# Patient Record
Sex: FEMALE | Race: WHITE | NOT HISPANIC OR LATINO | Employment: STUDENT | URBAN - METROPOLITAN AREA
[De-identification: names, ages, dates, MRNs, and addresses within clinical notes are randomized per-mention and may not be internally consistent; named-entity substitution may affect disease eponyms.]

---
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
## 2022-01-19 ENCOUNTER — OFFICE VISIT (OUTPATIENT)
Dept: OBSTETRICS AND GYNECOLOGY | Age: 22
End: 2022-01-19

## 2022-01-19 VITALS
BODY MASS INDEX: 23.86 KG/M2 | HEIGHT: 61 IN | WEIGHT: 126.4 LBS | DIASTOLIC BLOOD PRESSURE: 64 MMHG | SYSTOLIC BLOOD PRESSURE: 104 MMHG

## 2022-01-19 DIAGNOSIS — Z30.41 ORAL CONTRACEPTIVE USE: Primary | ICD-10-CM

## 2022-01-19 PROCEDURE — 99212 OFFICE O/P EST SF 10 MIN: CPT | Performed by: STUDENT IN AN ORGANIZED HEALTH CARE EDUCATION/TRAINING PROGRAM

## 2022-01-19 NOTE — PROGRESS NOTES
[facility]   Obstetrics and Gynecology     2022      Patient:  Giovanna Carrero   MR#:[id number]    Office note    Chief Complaint   Patient presents with   • Follow-up     Follow up 3 mos OCP's and B/P, No problems today       Subjective     History of Present Illness  21 y.o. female  presents for follow-up after starting lo-Loestrin.  She is doing well with taking it every day.  Reports 1 to 2 days of light spotting each month but no other breakthrough bleeding.  Denies headache, nausea, breast tenderness.  Overall really likes and wants to continue taking it.  She is now sexually active with her partner.    There is no problem list on file for this patient.      Past Medical History:   Diagnosis Date   • No pertinent past medical history      Past Surgical History:   Procedure Laterality Date   • TONSILLECTOMY     • WISDOM TOOTH EXTRACTION       Obstetric History:  OB History        0    Para   0    Term   0       0    AB   0    Living   0       SAB   0    IAB   0    Ectopic   0    Molar   0    Multiple   0    Live Births   0               Menstrual History:     No LMP recorded (lmp unknown). (Menstrual status: Oral contraceptives).       The patient has never been pregnant.  Family History   Problem Relation Age of Onset   • Diabetes Father    • Hypertension Father    • Stroke Mother    • Breast cancer Neg Hx    • Ovarian cancer Neg Hx    • Uterine cancer Neg Hx    • Colon cancer Neg Hx      Social History     Tobacco Use   • Smoking status: Never Smoker   • Smokeless tobacco: Never Used   Vaping Use   • Vaping Use: Never used   Substance Use Topics   • Alcohol use: Never   • Drug use: Never     Patient has no known allergies.    Current Outpatient Medications:   •  Norethin-Eth Estrad-Fe Biphas (Lo Loestrin Fe) 1 MG-10 MCG / 10 MCG tablet, Take 1 tablet by mouth Daily., Disp: 84 tablet, Rfl: 3    The following portions of the patient's history were reviewed and updated  "as appropriate: allergies, current medications, past family history, past medical history, past social history, past surgical history and problem list.    Review of Systems   All other systems reviewed and are negative.      BP Readings from Last 3 Encounters:   01/19/22 104/64   10/13/21 104/60      Wt Readings from Last 3 Encounters:   01/19/22 57.3 kg (126 lb 6.4 oz) (48 %, Z= -0.06)*   10/13/21 55 kg (121 lb 3.2 oz) (38 %, Z= -0.30)*     * Growth percentiles are based on CDC (Girls, 2-20 Years) data.      BMI: Estimated body mass index is 23.88 kg/m² as calculated from the following:    Height as of this encounter: 154.9 cm (61\").    Weight as of this encounter: 57.3 kg (126 lb 6.4 oz). BSA: Estimated body surface area is 1.55 meters squared as calculated from the following:    Height as of this encounter: 154.9 cm (61\").    Weight as of this encounter: 57.3 kg (126 lb 6.4 oz).    Objective   Physical Exam  Vitals and nursing note reviewed.   Constitutional:       General: She is not in acute distress.     Appearance: Normal appearance.   Pulmonary:      Effort: Respiratory distress present.   Neurological:      Mental Status: She is alert.         Assessment/Plan     Diagnoses and all orders for this visit:    1. Oral contraceptive use (Primary)    - no issues with lo-loestrin, will continue, f/u one year for AC    Return in about 1 year (around 1/19/2023) for Annual physical.    Rula Waller MD   1/19/2022 08:55 EST  "

## 2022-02-04 ENCOUNTER — TELEPHONE (OUTPATIENT)
Dept: OBSTETRICS AND GYNECOLOGY | Age: 22
End: 2022-02-04

## 2022-02-16 ENCOUNTER — OFFICE VISIT (OUTPATIENT)
Dept: OBSTETRICS AND GYNECOLOGY | Age: 22
End: 2022-02-16

## 2022-02-16 VITALS
WEIGHT: 126 LBS | SYSTOLIC BLOOD PRESSURE: 104 MMHG | DIASTOLIC BLOOD PRESSURE: 62 MMHG | HEIGHT: 61 IN | BODY MASS INDEX: 23.79 KG/M2

## 2022-02-16 DIAGNOSIS — R35.0 URINARY FREQUENCY: Primary | ICD-10-CM

## 2022-02-16 DIAGNOSIS — Z13.89 SCREENING FOR BLOOD OR PROTEIN IN URINE: ICD-10-CM

## 2022-02-16 LAB
BILIRUB BLD-MCNC: ABNORMAL MG/DL
CLARITY, POC: CLEAR
COLOR UR: YELLOW
GLUCOSE UR STRIP-MCNC: NEGATIVE MG/DL
KETONES UR QL: NEGATIVE
LEUKOCYTE EST, POC: NEGATIVE
NITRITE UR-MCNC: NEGATIVE MG/ML
PH UR: 5.5 [PH] (ref 5–8)
PROT UR STRIP-MCNC: ABNORMAL MG/DL
RBC # UR STRIP: ABNORMAL /UL
SP GR UR: 1.03 (ref 1–1.03)
UROBILINOGEN UR QL: NORMAL

## 2022-02-16 PROCEDURE — 81002 URINALYSIS NONAUTO W/O SCOPE: CPT | Performed by: NURSE PRACTITIONER

## 2022-02-16 PROCEDURE — 99213 OFFICE O/P EST LOW 20 MIN: CPT | Performed by: NURSE PRACTITIONER

## 2022-02-16 RX ORDER — NITROFURANTOIN 25; 75 MG/1; MG/1
100 CAPSULE ORAL 2 TIMES DAILY
Qty: 14 CAPSULE | Refills: 0 | Status: SHIPPED | OUTPATIENT
Start: 2022-02-16 | End: 2022-02-23

## 2022-02-16 NOTE — PROGRESS NOTES
"Subjective     Chief Complaint   Patient presents with   • Follow-up     UTI       Giovanna Carrero is a 21 y.o.  whose LMP is Patient's last menstrual period was 2022.     Pt presents today with chief complaint of urinary frequency x 2 weeks  She denies dysuria or abnormal discharge  She is SA with fiance, no std risk  Denies pelvic pain, fever, chills  Pt of Dr. Waller      No Additional Complaints Reported    The following portions of the patient's history were reviewed and updated as appropriate:vital signs, allergies, current medications, past medical history, past social history, past surgical history and problem list      Review of Systems   Pertinent items are noted in HPI.     Objective      /62   Ht 154.9 cm (61\")   Wt 57.2 kg (126 lb)   LMP 2022   BMI 23.81 kg/m²     Physical Exam    General:   alert and no distress   Heart: Not performed today   Lungs: Not performed today.   Breast: Not performed today   Neck: na   Abdomen: {Not performed today   CVA: Not performed today   Pelvis: Not performed today   Extremities: Not performed today   Neurologic: negative   Psychiatric: Normal affect, judgement, and mood       Lab Review   Labs: U/A     Imaging   No data reviewed    Assessment/Plan     ASSESSMENT  1. Urinary frequency    2. Screening for blood or protein in urine        PLAN  1.   Orders Placed This Encounter   Procedures   • Urine Culture - Urine, Urine, Clean Catch   • POC Urinalysis Dipstick       2. Medications prescribed this encounter:        New Medications Ordered This Visit   Medications   • nitrofurantoin, macrocrystal-monohydrate, (MACROBID) 100 MG capsule     Sig: Take 1 capsule by mouth 2 (Two) Times a Day for 7 days.     Dispense:  14 capsule     Refill:  0       3. Will treat for UTI. Urine culture sent, will call with results. F/u PRN.    Anali Campos, APRN  2022           "

## 2022-02-20 LAB
BACTERIA UR CULT: ABNORMAL
BACTERIA UR CULT: ABNORMAL
OTHER ANTIBIOTIC SUSC ISLT: ABNORMAL

## 2022-03-09 ENCOUNTER — OFFICE VISIT (OUTPATIENT)
Dept: OBSTETRICS AND GYNECOLOGY | Age: 22
End: 2022-03-09

## 2022-03-09 VITALS
WEIGHT: 131 LBS | HEIGHT: 61 IN | SYSTOLIC BLOOD PRESSURE: 104 MMHG | DIASTOLIC BLOOD PRESSURE: 68 MMHG | BODY MASS INDEX: 24.73 KG/M2

## 2022-03-09 DIAGNOSIS — R30.0 DYSURIA: ICD-10-CM

## 2022-03-09 DIAGNOSIS — Z13.89 SCREENING FOR BLOOD OR PROTEIN IN URINE: ICD-10-CM

## 2022-03-09 DIAGNOSIS — R39.89 SUSPECTED UTI: Primary | ICD-10-CM

## 2022-03-09 LAB
BILIRUB BLD-MCNC: NEGATIVE MG/DL
CLARITY, POC: CLEAR
COLOR UR: YELLOW
GLUCOSE UR STRIP-MCNC: NEGATIVE MG/DL
KETONES UR QL: NEGATIVE
LEUKOCYTE EST, POC: NEGATIVE
NITRITE UR-MCNC: NEGATIVE MG/ML
PH UR: 6.5 [PH] (ref 5–8)
PROT UR STRIP-MCNC: ABNORMAL MG/DL
RBC # UR STRIP: ABNORMAL /UL
SP GR UR: 1.03 (ref 1–1.03)
UROBILINOGEN UR QL: NORMAL

## 2022-03-09 PROCEDURE — 99213 OFFICE O/P EST LOW 20 MIN: CPT | Performed by: NURSE PRACTITIONER

## 2022-03-09 PROCEDURE — 81002 URINALYSIS NONAUTO W/O SCOPE: CPT | Performed by: NURSE PRACTITIONER

## 2022-03-09 RX ORDER — SULFAMETHOXAZOLE AND TRIMETHOPRIM 800; 160 MG/1; MG/1
1 TABLET ORAL 2 TIMES DAILY
Qty: 10 TABLET | Refills: 0 | Status: SHIPPED | OUTPATIENT
Start: 2022-03-09 | End: 2022-03-14

## 2022-03-09 NOTE — PROGRESS NOTES
"Subjective     Chief Complaint   Patient presents with   • Follow-up     UTI, slightly better, urine frequency       Giovanna Carrero is a 21 y.o.  whose LMP is Patient's last menstrual period was 2022.     Pt presents today for follow up on UTI  She was seen on  and dx with UTI - treated with macrobid but only took 5 days  She reports frequency of urination  Denies dysuria, low back pain, pelvic pain or abnormal discharge  No fever, chills or BA        No Additional Complaints Reported    The following portions of the patient's history were reviewed and updated as appropriate:vital signs, allergies, current medications, past medical history, past social history, past surgical history and problem list      Review of Systems   Pertinent items are noted in HPI.     Objective      /68   Ht 154.9 cm (61\")   Wt 59.4 kg (131 lb)   LMP 2022   BMI 24.75 kg/m²     Physical Exam    General:   alert and no distress   Heart: Not performed today   Lungs: Not performed today.   Breast: Not performed today   Neck: na   Abdomen: {Not performed today   CVA: Not performed today   Pelvis: Not performed today   Extremities: Not performed today   Neurologic: negative   Psychiatric: Normal affect, judgement, and mood       Lab Review   Labs: U/A     Imaging   No data reviewed    Assessment/Plan     ASSESSMENT  1. Suspected UTI    2. Screening for blood or protein in urine    3. Dysuria        PLAN  1.   Orders Placed This Encounter   Procedures   • Urine Culture - Urine, Urine, Clean Catch   • POC Urinalysis Dipstick       2. Medications prescribed this encounter:        New Medications Ordered This Visit   Medications   • sulfamethoxazole-trimethoprim (Bactrim DS) 800-160 MG per tablet     Sig: Take 1 tablet by mouth 2 (Two) Times a Day for 5 days.     Dispense:  10 tablet     Refill:  0       3. Treat with bactrim. Complete all rx. Will call with culture results.     Follow up: PRN    Anali Campos, " APRN  3/9/2022

## 2022-03-12 LAB
BACTERIA UR CULT: ABNORMAL
OTHER ANTIBIOTIC SUSC ISLT: ABNORMAL

## 2022-03-14 RX ORDER — NITROFURANTOIN 25; 75 MG/1; MG/1
100 CAPSULE ORAL 2 TIMES DAILY
Qty: 14 CAPSULE | Refills: 0 | Status: SHIPPED | OUTPATIENT
Start: 2022-03-14 | End: 2022-03-21

## 2022-11-11 RX ORDER — NORETHINDRONE ACETATE AND ETHINYL ESTRADIOL, ETHINYL ESTRADIOL AND FERROUS FUMARATE 1MG-10(24)
KIT ORAL
Qty: 84 TABLET | Refills: 3 | Status: SHIPPED | OUTPATIENT
Start: 2022-11-11 | End: 2023-01-25 | Stop reason: SDUPTHER

## 2023-01-25 ENCOUNTER — OFFICE VISIT (OUTPATIENT)
Dept: OBSTETRICS AND GYNECOLOGY | Age: 23
End: 2023-01-25
Payer: COMMERCIAL

## 2023-01-25 VITALS
BODY MASS INDEX: 27.45 KG/M2 | WEIGHT: 145.4 LBS | SYSTOLIC BLOOD PRESSURE: 120 MMHG | HEIGHT: 61 IN | DIASTOLIC BLOOD PRESSURE: 70 MMHG

## 2023-01-25 DIAGNOSIS — Z11.3 SCREEN FOR STD (SEXUALLY TRANSMITTED DISEASE): ICD-10-CM

## 2023-01-25 DIAGNOSIS — Z01.419 WELL WOMAN EXAM WITH ROUTINE GYNECOLOGICAL EXAM: Primary | ICD-10-CM

## 2023-01-25 DIAGNOSIS — Z30.41 ORAL CONTRACEPTIVE USE: ICD-10-CM

## 2023-01-25 PROCEDURE — 99395 PREV VISIT EST AGE 18-39: CPT | Performed by: STUDENT IN AN ORGANIZED HEALTH CARE EDUCATION/TRAINING PROGRAM

## 2023-01-25 RX ORDER — NORETHINDRONE ACETATE AND ETHINYL ESTRADIOL, ETHINYL ESTRADIOL AND FERROUS FUMARATE 1MG-10(24)
1 KIT ORAL DAILY
Qty: 84 TABLET | Refills: 3 | Status: SHIPPED | OUTPATIENT
Start: 2023-01-25

## 2023-01-25 RX ORDER — AMITRIPTYLINE HYDROCHLORIDE 10 MG/1
TABLET, FILM COATED ORAL
COMMUNITY
Start: 2022-11-11

## 2023-01-25 NOTE — PROGRESS NOTES
[facility]   Obstetrics and Gynecology   Routine Annual Visit    2023    Patient: Giovanna Hickey          MR#:[id number]    History of Present Illness    Chief Complaint   Patient presents with   • Annual Exam     AE today, Last AE 10/13/2021, OCP's, No problems today       22 y.o. female  who presents for annual exam.  Doing well today.  She has been taking low Loestrin this year and likes it.  Reports regular monthly menses lasting 2 to 3 days.  No issues here.    She was having recurrent UTIs and has been put on amitriptyline by PCP which seems to control her symptoms.    Obstetric History:  OB History        0    Para   0    Term   0       0    AB   0    Living   0       SAB   0    IAB   0    Ectopic   0    Molar   0    Multiple   0    Live Births   0               Menstrual History:     Patient's last menstrual period was 2023 (approximate).       Sexual History:   Sexually active with , desires STD screen, declined serum      Social History:   In [employer] school    ________________________________________  There is no problem list on file for this patient.    Past Medical History:   Diagnosis Date   • No pertinent past medical history      Past Surgical History:   Procedure Laterality Date   • TONSILLECTOMY     • WISDOM TOOTH EXTRACTION       Social History     Tobacco Use   Smoking Status Never   Smokeless Tobacco Never     Family History   Problem Relation Age of Onset   • Diabetes Father    • Hypertension Father    • Stroke Mother    • Breast cancer Neg Hx    • Ovarian cancer Neg Hx    • Uterine cancer Neg Hx    • Colon cancer Neg Hx      Prior to Admission medications    Medication Sig Start Date End Date Taking? Authorizing Provider   amitriptyline (ELAVIL) 10 MG tablet amitriptyline 10 mg tablet   TAKE 1 TABLET BY MOUTH NIGHTLY 22  Yes Provider, MD Shayla Stanley Loestrin Fe 1 MG-10 MCG / 10 MCG tablet TAKE 1 TABLET BY MOUTH EVERY DAY 22  " Yes Rula Waller MD   VITAMIN D PO Take  by mouth.   Yes Provider, MD Jaden     ________________________________________    Current contraception: OCP (estrogen/progesterone)  History of abnormal Pap smear: no  Family history of uterine or ovarian cancer: no  Family History of colon cancer/colon polyps: no  History of abnormal mammogram: no    The following portions of the patient's history were reviewed and updated as appropriate: allergies, current medications, past family history, past medical history, past social history, past surgical history and problem list.    Review of Systems   All other systems reviewed and are negative.           Objective     /70   Ht 154.9 cm (61\")   Wt 66 kg (145 lb 6.4 oz)   LMP 01/14/2023 (Approximate)   Breastfeeding No   BMI 27.47 kg/m²    BP Readings from Last 3 Encounters:   01/25/23 120/70   03/09/22 104/68   02/16/22 104/62      Wt Readings from Last 3 Encounters:   01/25/23 66 kg (145 lb 6.4 oz)   03/09/22 59.4 kg (131 lb) (56 %, Z= 0.14)*   02/16/22 57.2 kg (126 lb) (47 %, Z= -0.09)*     * Growth percentiles are based on CDC (Girls, 2-20 Years) data.        BMI: Estimated body mass index is 27.47 kg/m² as calculated from the following:    Height as of this encounter: 154.9 cm (61\").    Weight as of this encounter: 66 kg (145 lb 6.4 oz).    Physical Exam  Vitals and nursing note reviewed.   Constitutional:       General: She is not in acute distress.     Appearance: Normal appearance.   HENT:      Head: Normocephalic and atraumatic.   Eyes:      Extraocular Movements: Extraocular movements intact.   Cardiovascular:      Rate and Rhythm: Normal rate and regular rhythm.      Pulses: Normal pulses.      Heart sounds: No murmur heard.  Pulmonary:      Effort: Pulmonary effort is normal. No respiratory distress.      Breath sounds: Normal breath sounds.   Chest:   Breasts:     Right: Normal. No mass, nipple discharge, skin change or tenderness.      " Left: Normal. No mass, nipple discharge, skin change or tenderness.   Abdominal:      General: There is no distension.      Palpations: Abdomen is soft. There is no mass.      Tenderness: There is no abdominal tenderness.   Genitourinary:     General: Normal vulva.      Labia:         Right: No rash or lesion.         Left: No rash or lesion.       Urethra: No prolapse, urethral swelling or urethral lesion.      Vagina: Normal.      Cervix: Normal.      Uterus: Normal.       Adnexa: Right adnexa normal and left adnexa normal.      Comments: Bladder: no masses or tenderness  Perineum/Anus: no masses, lesions, or skin changes  Musculoskeletal:         General: No swelling. Normal range of motion.      Cervical back: Normal range of motion.   Lymphadenopathy:      Upper Body:      Right upper body: No axillary adenopathy.      Left upper body: No axillary adenopathy.   Skin:     General: Skin is warm and dry.   Neurological:      General: No focal deficit present.      Mental Status: She is alert and oriented to person, place, and time.   Psychiatric:         Mood and Affect: Mood normal.         Behavior: Behavior normal.         As part of wellness and prevention, the following topics were discussed with the patient:  Encouraged self breast exam  Physical activity and regular exercised encouraged.   Injury prevention discussed.  Healthy weight discussed.  Nutrition discussed.  Substance abuse/misuse discussed.  Sexual behavior/safe practices discussed.   Sexual transmitted disease prevention   Contraception discussed.   Mental health discussed.   Vaccinations/immunizations addressed.             Assessment:  Diagnoses and all orders for this visit:    1. Well woman exam with routine gynecological exam (Primary)  -     Chlamydia trachomatis, Neisseria gonorrhoeae, Trichomonas vaginalis, PCR - Swab, Vagina    2. Oral contraceptive use    3. Screen for STD (sexually transmitted disease)  -     Chlamydia trachomatis,  Neisseria gonorrhoeae, Trichomonas vaginalis, PCR - Swab, Vagina    Other orders  -     Norethin-Eth Estrad-Fe Biphas (Lo Loestrin Fe) 1 MG-10 MCG / 10 MCG tablet; Take 1 tablet by mouth Daily.  Dispense: 84 tablet; Refill: 3      -Breast and pelvic exam normal  - STD screen today, declined serum  - Patient wants to stay on lo Loestrin despite high co-pay due to mother's history of stroke while on OCPs.  Discount card provided.    Plan:  Return in about 1 year (around 1/25/2024) for Annual.      Rula Waller MD  1/25/2023 09:53 EST

## 2023-01-27 LAB
C TRACH RRNA SPEC QL NAA+PROBE: NEGATIVE
N GONORRHOEA RRNA SPEC QL NAA+PROBE: NEGATIVE
T VAGINALIS RRNA SPEC QL NAA+PROBE: NEGATIVE

## 2023-01-27 NOTE — PROGRESS NOTES
Please call patient to let her know her STD panel was negative. Thank you!    Rula Waller MD  1/27/2023  08:11 EST

## 2024-02-06 ENCOUNTER — OFFICE VISIT (OUTPATIENT)
Dept: OBSTETRICS AND GYNECOLOGY | Age: 24
End: 2024-02-06
Payer: COMMERCIAL

## 2024-02-06 VITALS
DIASTOLIC BLOOD PRESSURE: 62 MMHG | HEIGHT: 61 IN | WEIGHT: 148.4 LBS | BODY MASS INDEX: 28.02 KG/M2 | SYSTOLIC BLOOD PRESSURE: 110 MMHG

## 2024-02-06 DIAGNOSIS — N91.2 AMENORRHEA: ICD-10-CM

## 2024-02-06 DIAGNOSIS — Z01.419 WELL FEMALE EXAM WITH ROUTINE GYNECOLOGICAL EXAM: Primary | ICD-10-CM

## 2024-02-06 DIAGNOSIS — Z11.3 SCREEN FOR STD (SEXUALLY TRANSMITTED DISEASE): ICD-10-CM

## 2024-02-06 DIAGNOSIS — Z11.51 SCREENING FOR HUMAN PAPILLOMAVIRUS (HPV): ICD-10-CM

## 2024-02-06 DIAGNOSIS — Z12.4 SCREENING FOR MALIGNANT NEOPLASM OF CERVIX: ICD-10-CM

## 2024-02-06 LAB
B-HCG UR QL: ABNORMAL
EXPIRATION DATE: ABNORMAL
INTERNAL NEGATIVE CONTROL: NEGATIVE
INTERNAL POSITIVE CONTROL: POSITIVE
Lab: ABNORMAL

## 2024-02-06 RX ORDER — PRENATAL VIT/IRON FUM/FOLIC AC 27MG-0.8MG
TABLET ORAL DAILY
COMMUNITY

## 2024-02-06 NOTE — PROGRESS NOTES
Subjective     Chief Complaint   Patient presents with    Gynecologic Exam     Annual exam, first pap today, pt no longer taking birth control and trying to get pregnant, pt states she missed her period in January and took a pregnancy test and it was negative       History of Present Illness    Giovanna Hickey is a 23 y.o.  who presents for annual exam.  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         Current contraception: none  History of abnormal Pap smear:n/a  Received Gardasil immunization: no  Perform regular self breast exam: no  Family history of uterine or ovarian cancer: no  Family History of colon cancer: no  Family history of breast cancer: no    Mammogram: not indicated.  Colonoscopy: not indicated.  DEXA: not indicated.    Exercise: moderately active  Calcium/Vitamin D: adequate intake    The following portions of the patient's history were reviewed and updated as appropriate: allergies, current medications, past family history, past medical history, past social history, past surgical history, and problem list.    Review of Systems   Constitutional: Negative.    HENT: Negative.     Eyes: Negative.    Respiratory: Negative.     Cardiovascular: Negative.    Gastrointestinal: Negative.    Endocrine: Negative.    Genitourinary:         Amenorrhea     Musculoskeletal: Negative.    Skin: Negative.    Allergic/Immunologic: Negative.    Neurological: Negative.    Hematological: Negative.   "  Psychiatric/Behavioral: Negative.             Objective   Physical Exam  Vitals and nursing note reviewed.   Constitutional:       Appearance: Normal appearance.   HENT:      Head: Normocephalic.   Eyes:      Pupils: Pupils are equal, round, and reactive to light.   Cardiovascular:      Rate and Rhythm: Normal rate and regular rhythm.      Pulses: Normal pulses.      Heart sounds: Normal heart sounds.   Pulmonary:      Effort: Pulmonary effort is normal.      Breath sounds: Normal breath sounds.   Chest:   Breasts:     Right: Normal.      Left: Normal.   Abdominal:      General: Abdomen is flat. Bowel sounds are normal.      Palpations: Abdomen is soft.   Genitourinary:     General: Normal vulva.      Exam position: Lithotomy position.      Vagina: Normal.      Cervix: Normal.      Uterus: Normal.       Adnexa: Right adnexa normal and left adnexa normal.      Rectum: Normal.   Musculoskeletal:         General: Normal range of motion.      Cervical back: Full passive range of motion without pain and normal range of motion.      Right lower leg: No edema.      Left lower leg: No edema.   Lymphadenopathy:      Head:      Right side of head: No submental or submandibular adenopathy.      Left side of head: No submental or submandibular adenopathy.   Skin:     General: Skin is warm and dry.   Neurological:      General: No focal deficit present.      Mental Status: She is alert.      Gait: Gait is intact.   Psychiatric:         Attention and Perception: Attention normal.         Mood and Affect: Mood normal.         Speech: Speech normal.         /62   Ht 154.9 cm (61\")   Wt 67.3 kg (148 lb 6.4 oz)   LMP 12/25/2023 (Exact Date)   BMI 28.04 kg/m²     Assessment & Plan   Diagnoses and all orders for this visit:    1. Well female exam with routine gynecological exam (Primary)    2. Screening for human papillomavirus (HPV)  -     IGP, Rfx Aptima HPV ASCU    3. Screening for malignant neoplasm of cervix  -     " IGP, Rfx Aptima HPV ASCU    4. Screen for STD (sexually transmitted disease)  -     NuSwab VG+ - Swab, Vagina    5. Amenorrhea  -     HCG, B-subunit, Quantitative ([facility] Only)  -     POC Pregnancy, Urine      Pap today   Nuswab to r/o infection  Pt today is indeterminate will check beta   All questions answered.  Breast self exam technique reviewed and patient encouraged to perform self-exam monthly.  Discussed healthy lifestyle modifications.  Recommended 30 minutes of aerobic exercise five times per week.  Discussed calcium needs to prevent osteoporosis.

## 2024-02-07 LAB — HCG INTACT+B SERPL-ACNC: <1 MIU/ML

## 2024-02-08 LAB
A VAGINAE DNA VAG QL NAA+PROBE: NORMAL SCORE
BVAB2 DNA VAG QL NAA+PROBE: NORMAL SCORE
C ALBICANS DNA VAG QL NAA+PROBE: NEGATIVE
C GLABRATA DNA VAG QL NAA+PROBE: NEGATIVE
C TRACH DNA VAG QL NAA+PROBE: NEGATIVE
MEGA1 DNA VAG QL NAA+PROBE: NORMAL SCORE
N GONORRHOEA DNA VAG QL NAA+PROBE: NEGATIVE
T VAGINALIS DNA VAG QL NAA+PROBE: NEGATIVE

## 2024-02-09 LAB
CONV .: NORMAL
CYTOLOGIST CVX/VAG CYTO: NORMAL
CYTOLOGY CVX/VAG DOC CYTO: NORMAL
CYTOLOGY CVX/VAG DOC THIN PREP: NORMAL
DX ICD CODE: NORMAL
HIV 1 & 2 AB SER-IMP: NORMAL
OTHER STN SPEC: NORMAL
STAT OF ADQ CVX/VAG CYTO-IMP: NORMAL

## 2024-02-13 ENCOUNTER — TELEPHONE (OUTPATIENT)
Facility: HOSPITAL | Age: 24
End: 2024-02-13
Payer: COMMERCIAL

## 2024-05-20 ENCOUNTER — OFFICE VISIT (OUTPATIENT)
Dept: OBSTETRICS AND GYNECOLOGY | Age: 24
End: 2024-05-20
Payer: COMMERCIAL

## 2024-05-20 VITALS
HEIGHT: 61 IN | DIASTOLIC BLOOD PRESSURE: 70 MMHG | BODY MASS INDEX: 28.89 KG/M2 | WEIGHT: 153 LBS | SYSTOLIC BLOOD PRESSURE: 108 MMHG

## 2024-05-20 DIAGNOSIS — R30.0 DYSURIA: Primary | ICD-10-CM

## 2024-05-20 LAB
BILIRUB BLD-MCNC: NEGATIVE MG/DL
CLARITY, POC: CLEAR
COLOR UR: YELLOW
GLUCOSE UR STRIP-MCNC: NEGATIVE MG/DL
KETONES UR QL: NEGATIVE
LEUKOCYTE EST, POC: ABNORMAL
NITRITE UR-MCNC: NEGATIVE MG/ML
PH UR: 5.5 [PH] (ref 5–8)
PROT UR STRIP-MCNC: ABNORMAL MG/DL
RBC # UR STRIP: ABNORMAL /UL
SP GR UR: 1.03 (ref 1–1.03)
UROBILINOGEN UR QL: ABNORMAL

## 2024-05-20 PROCEDURE — 99213 OFFICE O/P EST LOW 20 MIN: CPT | Performed by: NURSE PRACTITIONER

## 2024-05-20 PROCEDURE — 81002 URINALYSIS NONAUTO W/O SCOPE: CPT | Performed by: NURSE PRACTITIONER

## 2024-05-20 RX ORDER — SULFAMETHOXAZOLE AND TRIMETHOPRIM 800; 160 MG/1; MG/1
1 TABLET ORAL 2 TIMES DAILY
Qty: 14 TABLET | Refills: 0 | Status: SHIPPED | OUTPATIENT
Start: 2024-05-20

## 2024-05-20 NOTE — PROGRESS NOTES
"Subjective   Giovanna Hickey is a 23 y.o. female is being seen today for   Chief Complaint   Patient presents with    Urinary Frequency     Slight burning   .    History of Present Illness    Patient here with UTI symptoms  States a few days ago she had burning with urination and could see blood in her urine  Currently she is having pelvic pressure and frequency, no dysuria now  No fevers or flank pain  They are trying for pregnancy so have been having more frequent IC  She is due to start her cycle in 4 days  No n/v    The following portions of the patient's history were reviewed and updated as appropriate: allergies, current medications, past family history, past medical history, past social history, past surgical history and problem list.    /70   Ht 154.9 cm (61\")   Wt 69.4 kg (153 lb)   LMP 04/24/2024   BMI 28.91 kg/m²         Review of Systems   Constitutional: Negative.    HENT: Negative.     Eyes: Negative.    Respiratory: Negative.     Cardiovascular: Negative.    Gastrointestinal: Negative.    Endocrine: Negative.    Genitourinary:  Positive for dysuria and hematuria.   Musculoskeletal: Negative.    Skin: Negative.    Allergic/Immunologic: Negative.    Neurological: Negative.    Hematological: Negative.    Psychiatric/Behavioral: Negative.         Objective   Physical Exam  Constitutional:       Appearance: She is well-developed.   Cardiovascular:      Rate and Rhythm: Normal rate and regular rhythm.   Pulmonary:      Effort: Pulmonary effort is normal.   Abdominal:      General: Bowel sounds are normal. There is no distension.      Palpations: Abdomen is soft.      Tenderness: There is no abdominal tenderness.   Skin:     General: Skin is warm and dry.   Neurological:      Mental Status: She is alert and oriented to person, place, and time.   Psychiatric:         Behavior: Behavior normal.           Assessment & Plan   Diagnoses and all orders for this visit:    1. Dysuria (Primary)  -     POC " Urinalysis Dipstick  -     Urine Culture - Urine, Urine, Clean Catch    Other orders  -     sulfamethoxazole-trimethoprim (Bactrim DS) 800-160 MG per tablet; Take 1 tablet by mouth 2 (Two) Times a Day.  Dispense: 14 tablet; Refill: 0      Start Bactrim- send urine culture  If s/s persist or worsen or if she develops a fever I instructed her to go to the ER for further evaluation           Total time spent today with Giovanna  was 20 minutes (level 3).  Greater than 50% of the time was spent coordinating care, answering her questions and counseling regarding pathophysiology of her presenting problem along with plans for any diagnositc work-up and treatment.

## 2024-05-23 LAB
BACTERIA UR CULT: ABNORMAL
BACTERIA UR CULT: ABNORMAL
OTHER ANTIBIOTIC SUSC ISLT: ABNORMAL

## 2024-05-28 ENCOUNTER — TELEPHONE (OUTPATIENT)
Dept: OBSTETRICS AND GYNECOLOGY | Age: 24
End: 2024-05-28

## 2024-05-28 ENCOUNTER — TELEPHONE (OUTPATIENT)
Dept: OBSTETRICS AND GYNECOLOGY | Age: 24
End: 2024-05-28
Payer: COMMERCIAL

## 2024-05-28 DIAGNOSIS — N92.6 MISSED MENSES: Primary | ICD-10-CM

## 2024-05-28 RX ORDER — CEPHALEXIN 500 MG/1
500 CAPSULE ORAL 2 TIMES DAILY
Qty: 10 CAPSULE | Refills: 0 | Status: SHIPPED | OUTPATIENT
Start: 2024-05-28 | End: 2024-06-02

## 2024-05-28 NOTE — TELEPHONE ENCOUNTER
Pt would like a different provider due to work issues pt can only do a Friday and would like a female provider.  Please advise how to schedule.

## 2024-05-29 DIAGNOSIS — N92.6 MISSED MENSES: Primary | ICD-10-CM

## 2024-05-30 ENCOUNTER — LAB (OUTPATIENT)
Dept: OBSTETRICS AND GYNECOLOGY | Age: 24
End: 2024-05-30
Payer: COMMERCIAL

## 2024-05-30 DIAGNOSIS — O23.41 URINARY TRACT INFECTION IN MOTHER DURING FIRST TRIMESTER OF PREGNANCY: Primary | ICD-10-CM

## 2024-05-30 LAB
BILIRUB BLD-MCNC: NEGATIVE MG/DL
CLARITY, POC: CLEAR
COLOR UR: YELLOW
GLUCOSE UR STRIP-MCNC: NEGATIVE MG/DL
KETONES UR QL: NEGATIVE
LEUKOCYTE EST, POC: NEGATIVE
NITRITE UR-MCNC: NEGATIVE MG/ML
PH UR: 8.5 [PH] (ref 5–8)
PROT UR STRIP-MCNC: NEGATIVE MG/DL
RBC # UR STRIP: NEGATIVE /UL
SP GR UR: 1.01 (ref 1–1.03)
UROBILINOGEN UR QL: NORMAL

## 2024-06-01 LAB
BACTERIA UR CULT: NO GROWTH
BACTERIA UR CULT: NORMAL

## 2024-06-07 ENCOUNTER — TELEPHONE (OUTPATIENT)
Dept: OBSTETRICS AND GYNECOLOGY | Age: 24
End: 2024-06-07
Payer: COMMERCIAL

## 2024-06-07 NOTE — TELEPHONE ENCOUNTER
Caller: Giovanna Hickey    Relationship: Self    Best call back number: [phone]    What is the best time to reach you: ANY    Who are you requesting to speak with (clinical staff, provider,  specific staff member): ANY    Do you know the name of the person who called: NO    What was the call regarding: SCHEDULING LABS.    PATIENT RECEIVED A VM SAYING THAT SHE NEEDED TO CALL THE OFFICE TO SCHEDULE LAB WORK. HUB UNABLE TO TRANSFER.

## 2024-06-19 ENCOUNTER — OFFICE VISIT (OUTPATIENT)
Dept: OBSTETRICS AND GYNECOLOGY | Age: 24
End: 2024-06-19
Payer: COMMERCIAL

## 2024-06-19 ENCOUNTER — TELEPHONE (OUTPATIENT)
Dept: OBSTETRICS AND GYNECOLOGY | Age: 24
End: 2024-06-19

## 2024-06-19 VITALS
DIASTOLIC BLOOD PRESSURE: 64 MMHG | WEIGHT: 151 LBS | SYSTOLIC BLOOD PRESSURE: 110 MMHG | BODY MASS INDEX: 28.51 KG/M2 | HEIGHT: 61 IN

## 2024-06-19 DIAGNOSIS — Z34.90 EARLY STAGE OF PREGNANCY: Primary | ICD-10-CM

## 2024-06-19 NOTE — PROGRESS NOTES
"Subjective     Chief Complaint   Patient presents with    Possible Pregnancy     Pregnancy confirmation with ultrasound.  LMP 2024, c/o brown spotting.       Giovanna Hickey is a 23 y.o.  whose LMP is Patient's last menstrual period was 2024 (exact date). presents for early ob scan    Giovanna had her new ob appt scheduled for Friday but she noted some brown d/c yesterday so we brought her in for an early scan  She denies preceding IC or straining with BM's  She did have early pregnancy levels checked at the end of May and they were rising appropriately  HCG, B-subunit, Quantitative (2024 14:56)   HCG, B-subunit, Quantitative ([facility] Only) (2024 14:51)     This is her first pregnancy  She is accompanied by her , Dave    She is a pt of Dr sevilla    She is healthy  Does note a family h/o low progesterone level in her mom and is requesting labs to check this today  She is taking a PNV    No Additional Complaints Reported    The following portions of the patient's history were reviewed and updated as appropriate:no additional history reviewed, vital signs, allergies, current medications, past medical history, past social history, past surgical history, and problem list      Review of Systems   Genitourinary:positive for early stage of pregnancy, brown d/c     Objective      /64   Ht 154.9 cm (61\")   Wt 68.5 kg (151 lb)   LMP 2024 (Exact Date)   BMI 28.53 kg/m²     Physical Exam    General:   Not performed today.   Heart: Not performed today   Lungs: Not performed today.   Breast: Not performed today   Neck: Not performed today   Abdomen: Not performed today   CVA: Not performed today   Pelvis: Not performed today   Extremities: Not performed today   Neurologic: Not performed today   Psychiatric: Not performed today       Lab Review   Labs: No data reviewed    Imaging   No data reviewed    Assessment & Plan     ASSESSMENT  1. Early stage of pregnancy          PLAN  1. "   Orders Placed This Encounter   Procedures    Urine Culture - Urine, Urine, Random Void    HCG, B-subunit, Quantitative    Progesterone    ABO / Rh       2. U/s is reassuring. Slightly discrepancy in dates, off by a week, pt notes irregular menses and did not track ovulation. She does think conception could have occurred b/t the 13th-20th May. She has no further bleeding or cramping at this time. Will plan early rpt scan in 2 wks but call for any pain or bleeding in the meantime  New ob folder given   Plan PNL at next visit  ABO obtained today and hcg and progesterone repeated per pt request  All questions answered    Follow up: 2 week(s)    SHARATH Mondragon  6/19/2024

## 2024-06-19 NOTE — TELEPHONE ENCOUNTER
Hub staff attempted to follow warm transfer process and was unsuccessful     Caller: Giovanna Hickey    Relationship to patient: Self    Best call back number: [phone] ANYTIME, IT IS OKAY TO LVM.    Patient is needing: PATIENT RETURNED CALL TO SCHEDULE FOR EARLIER OB SCAN. HUB UNABLE TO ADDEND PATIENT MESSAGE IN CHART FROM 06/18/24.     IF UNABLE TO ANSWER CALL BACK. PATIENT IS REQUESTING TO SCHEDULE FIRST AVAILABLE APPT AND LEAVE TIME AND DATE ON VOICEMAIL OR MYCHART MESSAGE.

## 2024-06-20 LAB
ABO GROUP BLD: NORMAL
HCG INTACT+B SERPL-ACNC: NORMAL MIU/ML
PROGEST SERPL-MCNC: 12.5 NG/ML
RH BLD: POSITIVE

## 2024-06-22 LAB
BACTERIA UR CULT: ABNORMAL
BACTERIA UR CULT: ABNORMAL
OTHER ANTIBIOTIC SUSC ISLT: ABNORMAL

## 2024-06-24 ENCOUNTER — TELEPHONE (OUTPATIENT)
Dept: OBSTETRICS AND GYNECOLOGY | Age: 24
End: 2024-06-24
Payer: COMMERCIAL

## 2024-06-24 DIAGNOSIS — N39.0 URINARY TRACT INFECTION WITHOUT HEMATURIA, SITE UNSPECIFIED: Primary | ICD-10-CM

## 2024-06-24 RX ORDER — AMOXICILLIN AND CLAVULANATE POTASSIUM 875; 125 MG/1; MG/1
1 TABLET, FILM COATED ORAL 2 TIMES DAILY
Qty: 10 TABLET | Refills: 0 | Status: SHIPPED | OUTPATIENT
Start: 2024-06-24 | End: 2024-06-29

## 2024-06-24 NOTE — TELEPHONE ENCOUNTER
PT wanting to know what her urine culture results meant. PT has not received a call from the office about her results. PT yamel Mckenzie

## 2024-07-12 ENCOUNTER — ROUTINE PRENATAL (OUTPATIENT)
Dept: OBSTETRICS AND GYNECOLOGY | Age: 24
End: 2024-07-12
Payer: COMMERCIAL

## 2024-07-12 VITALS — DIASTOLIC BLOOD PRESSURE: 78 MMHG | SYSTOLIC BLOOD PRESSURE: 128 MMHG | WEIGHT: 151 LBS | BODY MASS INDEX: 28.53 KG/M2

## 2024-07-12 DIAGNOSIS — R35.0 URINARY FREQUENCY: ICD-10-CM

## 2024-07-12 DIAGNOSIS — O21.9 NAUSEA/VOMITING IN PREGNANCY: ICD-10-CM

## 2024-07-12 DIAGNOSIS — Z13.89 SCREENING FOR BLOOD OR PROTEIN IN URINE: ICD-10-CM

## 2024-07-12 DIAGNOSIS — Z3A.10 10 WEEKS GESTATION OF PREGNANCY: Primary | ICD-10-CM

## 2024-07-12 LAB
BILIRUB BLD-MCNC: NEGATIVE MG/DL
CLARITY, POC: CLEAR
COLOR UR: YELLOW
GLUCOSE UR STRIP-MCNC: NEGATIVE MG/DL
KETONES UR QL: NEGATIVE
LEUKOCYTE EST, POC: NEGATIVE
NITRITE UR-MCNC: NEGATIVE MG/ML
PH UR: 5 [PH] (ref 5–8)
PROT UR STRIP-MCNC: NEGATIVE MG/DL
RBC # UR STRIP: ABNORMAL /UL
SP GR UR: 1.03 (ref 1–1.03)
UROBILINOGEN UR QL: NORMAL

## 2024-07-12 NOTE — PROGRESS NOTES
[facility]   Obstetrics and Gynecology   New Obstetric Visit    2024    Patient: Giovanna Hickey          MR#:[id number]    History of Present Illness    Chief Complaint   Patient presents with    Possible Pregnancy     Ob intake with repeat scan, 10w2d, kailey today, no complaints       24 y.o. female  at 10w2d presents for NOB visit.  She is doing well today.  Taking prenatal vitamins.  Some nausea and vomiting   Yong  is with her  Wants progesterone checked her mother had issues with loss  Having some urinary frequency no pain  Desires NIPT and hoping for a girl  No vb    Studies reviewed:    ________________________________________  There is no problem list on file for this patient.    OB History          1    Para   0    Term   0       0    AB   0    Living   0         SAB   0    IAB   0    Ectopic   0    Molar   0    Multiple   0    Live Births   0                  Social History:  Denies h/o gonorrhea, chlamydia, herpes, syphilis, HIV  Denies family history of birth defects and genetic disorders    Past Medical History:   Diagnosis Date    No pertinent past medical history      Past Surgical History:   Procedure Laterality Date    TONSILLECTOMY      WISDOM TOOTH EXTRACTION       Social History     Tobacco Use   Smoking Status Never    Passive exposure: Never   Smokeless Tobacco Never     Family History   Problem Relation Age of Onset    Diabetes Father     Hypertension Father     Stroke Mother     Breast cancer Neg Hx     Ovarian cancer Neg Hx     Uterine cancer Neg Hx     Colon cancer Neg Hx      Prior to Admission medications    Medication Sig Start Date End Date Taking? Authorizing Provider   Prenatal Vit-Fe Fumarate-FA (prenatal vitamin 27-0.8) 27-0.8 MG tablet tablet Take  by mouth Daily.   Yes Provider, MD Jaden     ________________________________________    The following portions of the patient's history were reviewed and updated as appropriate:  "allergies, current medications, past family history, past medical history, past social history, past surgical history, and problem list.    Review of Systems   Gastrointestinal:  Positive for nausea and vomiting.            Objective     /78   Wt 68.5 kg (151 lb)   LMP 04/24/2024 (Exact Date)   BMI 28.53 kg/m²    BP Readings from Last 3 Encounters:   07/12/24 128/78   06/19/24 110/64   05/20/24 108/70      Wt Readings from Last 3 Encounters:   07/12/24 68.5 kg (151 lb)   06/19/24 68.5 kg (151 lb)   05/20/24 69.4 kg (153 lb)        BMI: Estimated body mass index is 28.53 kg/m² as calculated from the following:    Height as of 6/19/24: 154.9 cm (61\").    Weight as of this encounter: 68.5 kg (151 lb).    Physical Exam  Constitutional:       General: She is not in acute distress.  Pulmonary:      Effort: Pulmonary effort is normal.   Abdominal:      Palpations: Abdomen is soft.      Tenderness: There is no abdominal tenderness.   Skin:     General: Skin is warm.   Neurological:      Mental Status: She is alert.   Psychiatric:         Mood and Affect: Mood normal.         Thought Content: Thought content normal.         Judgment: Judgment normal.           Assessment:  Diagnoses and all orders for this visit:    1. 10 weeks gestation of pregnancy (Primary)  -     [facility] Prenatal Test: Chromosomes 13, 18, 21, X & Y: Triploidy 22Q.11.2 Deletion - Blood,  -     Leeanna Horizon 14 (Pan-Ethnic Standard) - Blood,  -     CBC (No Diff)  -     Rubella Antibody, IgG  -     Varicella Zoster Antibody, IgG  -     Hemoglobin A1c  -     HIV-1 / O / 2 Ag / Antibody  -     Hepatitis C Antibody  -     Hepatitis B Surface Antigen  -     RPR, Rfx Qn RPR / Confirm TP  -     Progesterone  -     Urine Culture - Urine, Urine, Clean Catch    2. Screening for blood or protein in urine  -     POC Urinalysis Dipstick  -     Urine Culture - Urine, Urine, Clean Catch    3. Nausea/vomiting in pregnancy    4. Urinary " frequency        US Ob Transvaginal (07/12/2024 13:37)         Plan:TVUS reassuring today  Ob labs completed  Will check progesterone per pt request discussed normal findings with last lab normal  Will check urine culture   Early pregnancy counseling provided  Problem list reviewed and updated  Reviewed routine prenatal care with the office to include but not limited to expected weight gain during pregnancy, Tylenol products are fine, avoid ibuprofen; not to change cat litter; food restrictions; avoidance of alcohol, tobacco, drugs and saunas/hot tubs. Discussed that the COVID and Flu vaccine is safe and recommended in pregnancy.   SAB warnings reviewed  All questions answered  No follow-ups on file.      Antoinette Cali, APRN  7/12/2024 14:42 EDT

## 2024-07-13 LAB
ERYTHROCYTE [DISTWIDTH] IN BLOOD BY AUTOMATED COUNT: 13.8 % (ref 11.7–15.4)
HBA1C MFR BLD: 5.4 % (ref 4.8–5.6)
HBV SURFACE AG SERPL QL IA: NEGATIVE
HCT VFR BLD AUTO: 36.5 % (ref 34–46.6)
HCV IGG SERPL QL IA: NON REACTIVE
HGB BLD-MCNC: 12.4 G/DL (ref 11.1–15.9)
HIV 1+2 AB+HIV1 P24 AG SERPL QL IA: NON REACTIVE
MCH RBC QN AUTO: 30.2 PG (ref 26.6–33)
MCHC RBC AUTO-ENTMCNC: 34 G/DL (ref 31.5–35.7)
MCV RBC AUTO: 89 FL (ref 79–97)
PLATELET # BLD AUTO: 210 X10E3/UL (ref 150–450)
PROGEST SERPL-MCNC: 12.2 NG/ML
RBC # BLD AUTO: 4.1 X10E6/UL (ref 3.77–5.28)
RPR SER QL: NON REACTIVE
RUBV IGG SERPL IA-ACNC: 6.93 INDEX
VZV IGG SER IA-ACNC: 1409 INDEX
WBC # BLD AUTO: 9.2 X10E3/UL (ref 3.4–10.8)

## 2024-07-14 LAB
BACTERIA UR CULT: NORMAL
BACTERIA UR CULT: NORMAL

## 2024-07-21 LAB
Lab: ABNORMAL
Lab: POSITIVE
NTRA ALPHA-THALASSEMIA: NEGATIVE
NTRA BETA-HEMOGLOBINOPATHIES: NEGATIVE
NTRA CANAVAN DISEASE: NEGATIVE
NTRA CYSTIC FIBROSIS: NEGATIVE
NTRA DUCHENNE/BECKER MUSCULAR DYSTROPHY: NEGATIVE
NTRA FAMILIAL DYSAUTONOMIA: NEGATIVE
NTRA FRAGILE X SYNDROME: POSITIVE
NTRA GALACTOSEMIA: NEGATIVE
NTRA GAUCHER DISEASE: NEGATIVE
NTRA MEDIUM CHAIN ACYL-COA DEHYDROGENASE DEFICIENCY: NEGATIVE
NTRA POLYCYSTIC KIDNEY DISEASE, AUTOSOMAL RECESSIVE: NEGATIVE
NTRA SMITH-LEMLI-OPITZ SYNDROME: NEGATIVE
NTRA SPINAL MUSCULAR ATROPHY: NEGATIVE
NTRA TAY-SACHS DISEASE: NEGATIVE

## 2024-07-25 ENCOUNTER — TELEPHONE (OUTPATIENT)
Dept: OBSTETRICS AND GYNECOLOGY | Age: 24
End: 2024-07-25

## 2024-07-25 NOTE — TELEPHONE ENCOUNTER
If she does not want to go to [place] on Fridays and cannot come in on Wednesdays or Thursdays she will need to change providers.

## 2024-07-25 NOTE — TELEPHONE ENCOUNTER
Caller: Giovanna Hickey    Relationship to patient: Self    Best call back number: [phone]    Chief complaint: PREGNANT    Type of visit: OB APPT    Requested date: ON A FRIDAY (ORIGINAL APPT WAS ON FRIDAY 8/9/24 WITH BREANA) PT RECEIVED A MESSAGE THAT IT WAS CHANGED TO THURSDAY 8/8/24 W/DR. DAVILA      If rescheduling, when is the original appointment: 8/8/24     Additional notes:PT IS ONLY ABLE TO DO FRIDAY APPTS AND WISHES TO CHANGE APPT BACK TO FRIDAY 8/9/24 - NOT SURE IF SHE WILL NEED TO CHANGE PROVIDERS OR SEE APRN???    PLEASE CALL PT TO SCHEDULE    HUB UNABLE TO W/T TO OFFICE

## 2024-08-02 ENCOUNTER — TELEPHONE (OUTPATIENT)
Dept: OBSTETRICS AND GYNECOLOGY | Age: 24
End: 2024-08-02
Payer: COMMERCIAL

## 2024-08-02 RX ORDER — DOXYLAMINE SUCCINATE AND PYRIDOXINE HYDROCHLORIDE, DELAYED RELEASE TABLETS 10 MG/10 MG 10; 10 MG/1; MG/1
2 TABLET, DELAYED RELEASE ORAL NIGHTLY PRN
Qty: 60 TABLET | Refills: 1 | Status: SHIPPED | OUTPATIENT
Start: 2024-08-02

## 2024-08-02 NOTE — TELEPHONE ENCOUNTER
13w2d    Appt 8/16/24  Pt is requesting something for nausea. Pt states that she hasn't been able to keep anything down for the past day. Pt also thought that she maybe getting dehydrated. Advised pt to go to ER if she felt the need for fluids. Confirmed pharmacy on file. Please advise

## 2024-08-02 NOTE — TELEPHONE ENCOUNTER
Please let pt know I sent a rx for diclegis to her pharmacy. This is preferred medication for n/v of pregnancy.

## 2024-08-14 PROBLEM — Z14.8 CARRIER OF FRAGILE X SYNDROME: Status: ACTIVE | Noted: 2024-08-14

## 2024-08-16 ENCOUNTER — INITIAL PRENATAL (OUTPATIENT)
Dept: OBSTETRICS AND GYNECOLOGY | Age: 24
End: 2024-08-16
Payer: COMMERCIAL

## 2024-08-16 VITALS — WEIGHT: 146 LBS | BODY MASS INDEX: 27.59 KG/M2 | SYSTOLIC BLOOD PRESSURE: 124 MMHG | DIASTOLIC BLOOD PRESSURE: 72 MMHG

## 2024-08-16 DIAGNOSIS — Z3A.15 15 WEEKS GESTATION OF PREGNANCY: ICD-10-CM

## 2024-08-16 DIAGNOSIS — Z36.1 NEED FOR MATERNAL SERUM ALPHA-PROTEIN (MSAFP) SCREENING: ICD-10-CM

## 2024-08-16 DIAGNOSIS — R35.0 URINARY FREQUENCY: Primary | ICD-10-CM

## 2024-08-16 DIAGNOSIS — Z13.89 SCREENING FOR BLOOD OR PROTEIN IN URINE: ICD-10-CM

## 2024-08-16 PROBLEM — O23.41 UTI (URINARY TRACT INFECTION) IN PREGNANCY IN FIRST TRIMESTER: Status: ACTIVE | Noted: 2024-08-16

## 2024-08-16 LAB
BILIRUB BLD-MCNC: NEGATIVE MG/DL
GLUCOSE UR STRIP-MCNC: NEGATIVE MG/DL
KETONES UR QL: NEGATIVE
LEUKOCYTE EST, POC: NEGATIVE
NITRITE UR-MCNC: NEGATIVE MG/ML
PH UR: 5.5 [PH] (ref 5–8)
PROT UR STRIP-MCNC: NEGATIVE MG/DL
RBC # UR STRIP: ABNORMAL /UL
SP GR UR: 1.03 (ref 1–1.03)
UROBILINOGEN UR QL: ABNORMAL

## 2024-08-16 RX ORDER — NITROFURANTOIN 25; 75 MG/1; MG/1
100 CAPSULE ORAL 2 TIMES DAILY
Qty: 14 CAPSULE | Refills: 0 | Status: SHIPPED | OUTPATIENT
Start: 2024-08-16 | End: 2024-08-23

## 2024-08-16 NOTE — PROGRESS NOTES
Pt presents for routine visit. She denies any issues today but had urinary frequency yesterday. She denies dysuria or vag bleeding.     A/p: 15 weeks: reviewed aneuploidy and carrier results. Afp recommended today with ab screen and CZ/GC to complete routine pregnancy testing. F/u 4 weeks for anatomy u/s. Reviewed pnguidelines. Pt denies any questions and notes she reviewed those with NP previously.     Urinary frequency/hx UTI: pt notes symptoms are improved today. Will check ucx and sent rx for macrobid 100 mg twice daily for 7 days if symptoms worsen over the weekend prior to culture resulting. Reviewed pyelo warnings.     Fragile X carrier: discussed no risk to child but future generations could express. Provided printed handout reviewing dx and discussed options. Pt is not interested in local genetic counseling but may schedule through her lab.     Fu 4 wks or prn

## 2024-08-18 LAB
BACTERIA UR CULT: NO GROWTH
BACTERIA UR CULT: NORMAL

## 2024-08-19 LAB
AFP INTERP SERPL-IMP: NORMAL
AFP INTERP SERPL-IMP: NORMAL
AFP MOM SERPL: 1.34
AFP SERPL-MCNC: 40 NG/ML
AGE AT DELIVERY: 22.6 YR
BLD GP AB SCN SERPL QL: NEGATIVE
C TRACH RRNA SPEC QL NAA+PROBE: NEGATIVE
GA METHOD: NORMAL
GA: 15 WEEKS
IDDM PATIENT QL: NO
LABORATORY COMMENT REPORT: NORMAL
MULTIPLE PREGNANCY: NO
N GONORRHOEA RRNA SPEC QL NAA+PROBE: NEGATIVE
NEURAL TUBE DEFECT RISK FETUS: 4273 %
RESULT: NORMAL
T VAGINALIS RRNA SPEC QL NAA+PROBE: NEGATIVE

## 2024-09-13 ENCOUNTER — ROUTINE PRENATAL (OUTPATIENT)
Dept: OBSTETRICS AND GYNECOLOGY | Age: 24
End: 2024-09-13
Payer: COMMERCIAL

## 2024-09-13 VITALS — DIASTOLIC BLOOD PRESSURE: 76 MMHG | BODY MASS INDEX: 28.08 KG/M2 | SYSTOLIC BLOOD PRESSURE: 130 MMHG | WEIGHT: 148.6 LBS

## 2024-09-13 DIAGNOSIS — O44.40 LOW-LYING PLACENTA: ICD-10-CM

## 2024-09-13 DIAGNOSIS — Z3A.19 19 WEEKS GESTATION OF PREGNANCY: Primary | ICD-10-CM

## 2024-09-13 LAB
GLUCOSE UR STRIP-MCNC: NEGATIVE MG/DL
PROT UR STRIP-MCNC: NEGATIVE MG/DL

## 2024-10-11 ENCOUNTER — ROUTINE PRENATAL (OUTPATIENT)
Dept: OBSTETRICS AND GYNECOLOGY | Age: 24
End: 2024-10-11
Payer: COMMERCIAL

## 2024-10-11 VITALS — WEIGHT: 159.4 LBS | DIASTOLIC BLOOD PRESSURE: 80 MMHG | SYSTOLIC BLOOD PRESSURE: 126 MMHG | BODY MASS INDEX: 30.12 KG/M2

## 2024-10-11 DIAGNOSIS — O44.40 LOW-LYING PLACENTA: Primary | ICD-10-CM

## 2024-10-11 DIAGNOSIS — Z3A.23 23 WEEKS GESTATION OF PREGNANCY: ICD-10-CM

## 2024-10-11 LAB
GLUCOSE UR STRIP-MCNC: NEGATIVE MG/DL
PROT UR STRIP-MCNC: NEGATIVE MG/DL

## 2024-10-11 NOTE — PROGRESS NOTES
Pt presents for f/u visit. She is feeling fetal movement and denies reg ctx, vag bleeding or leaking fluid.     U/s: completed anatomy survey is normal. Placenta is no longer low lying. Appropriate growth at 80 % AC 73 %    A/p: 23 weeks: reviewed normal f/u anatomy and placenta is no longer low lying. F/u for one hour glucose and visit in 4 weeks. Recommended flu shot and pt desires today.

## 2024-11-08 ENCOUNTER — ROUTINE PRENATAL (OUTPATIENT)
Dept: OBSTETRICS AND GYNECOLOGY | Age: 24
End: 2024-11-08
Payer: COMMERCIAL

## 2024-11-08 VITALS — WEIGHT: 161.4 LBS | BODY MASS INDEX: 30.5 KG/M2 | DIASTOLIC BLOOD PRESSURE: 74 MMHG | SYSTOLIC BLOOD PRESSURE: 116 MMHG

## 2024-11-08 DIAGNOSIS — Z13.1 SCREENING FOR DIABETES MELLITUS: ICD-10-CM

## 2024-11-08 DIAGNOSIS — Z13.89 SCREENING FOR HEMATURIA OR PROTEINURIA: ICD-10-CM

## 2024-11-08 DIAGNOSIS — Z3A.27 27 WEEKS GESTATION OF PREGNANCY: Primary | ICD-10-CM

## 2024-11-08 LAB
GLUCOSE UR STRIP-MCNC: NEGATIVE MG/DL
PROT UR STRIP-MCNC: NEGATIVE MG/DL

## 2024-11-08 NOTE — PROGRESS NOTES
Pt presents for routine visit. She is feeling fetal movement. She denies ctx, vag bleeding/leaking fluid. She denies headache/vision changes.    A/p: 27 weeks: one hour glucose today, reviewed PTL and preeclamptic warnings. Advised daily fmc's. Recommend tdap and pt agrees today. Discussed hospital tours/classes and selecting pediatrician. F/u 2 weeks.

## 2024-11-12 LAB
ERYTHROCYTE [DISTWIDTH] IN BLOOD BY AUTOMATED COUNT: 12.1 % (ref 11.7–15.4)
GLUCOSE 1H P 50 G GLC PO SERPL-MCNC: 155 MG/DL (ref 70–139)
HCT VFR BLD AUTO: 36.7 % (ref 34–46.6)
HGB BLD-MCNC: 12.2 G/DL (ref 11.1–15.9)
MCH RBC QN AUTO: 30.7 PG (ref 26.6–33)
MCHC RBC AUTO-ENTMCNC: 33.2 G/DL (ref 31.5–35.7)
MCV RBC AUTO: 92 FL (ref 79–97)
PLATELET # BLD AUTO: 200 X10E3/UL (ref 150–450)
RBC # BLD AUTO: 3.97 X10E6/UL (ref 3.77–5.28)
TREPONEMA PALLIDUM IGG+IGM AB [PRESENCE] IN SERUM OR PLASMA BY IMMUNOASSAY: NON REACTIVE
WBC # BLD AUTO: 12.2 X10E3/UL (ref 3.4–10.8)

## 2024-11-13 DIAGNOSIS — R73.09 ELEVATED GLUCOSE TOLERANCE TEST: Primary | ICD-10-CM

## 2024-11-15 ENCOUNTER — LAB (OUTPATIENT)
Dept: OBSTETRICS AND GYNECOLOGY | Age: 24
End: 2024-11-15
Payer: COMMERCIAL

## 2024-11-16 LAB
GLUCOSE 1H P 100 G GLC PO SERPL-MCNC: 169 MG/DL (ref 70–179)
GLUCOSE 2H P 100 G GLC PO SERPL-MCNC: 129 MG/DL (ref 70–154)
GLUCOSE 3H P 100 G GLC PO SERPL-MCNC: 95 MG/DL (ref 70–139)
GLUCOSE P FAST SERPL-MCNC: 87 MG/DL (ref 70–94)
Lab: NORMAL

## 2024-11-22 ENCOUNTER — ROUTINE PRENATAL (OUTPATIENT)
Dept: OBSTETRICS AND GYNECOLOGY | Age: 24
End: 2024-11-22
Payer: COMMERCIAL

## 2024-11-22 VITALS — BODY MASS INDEX: 31.37 KG/M2 | SYSTOLIC BLOOD PRESSURE: 120 MMHG | DIASTOLIC BLOOD PRESSURE: 70 MMHG | WEIGHT: 166 LBS

## 2024-11-22 DIAGNOSIS — R73.09 ELEVATED GLUCOSE TOLERANCE TEST: ICD-10-CM

## 2024-11-22 DIAGNOSIS — Z13.89 SCREENING FOR BLOOD OR PROTEIN IN URINE: Primary | ICD-10-CM

## 2024-11-22 DIAGNOSIS — Z3A.29 29 WEEKS GESTATION OF PREGNANCY: ICD-10-CM

## 2024-11-22 LAB
GLUCOSE UR STRIP-MCNC: NEGATIVE MG/DL
PROT UR STRIP-MCNC: NEGATIVE MG/DL

## 2024-11-22 NOTE — PROGRESS NOTES
Pt presents for scheduled visit. She denies any issues. She is feeling fetal movement. She denies reg ctx, vag bleeding/leaking fluid.     A/p: 29 weeks: reviewed PTL and preeclamptic warnings. Advised daily fmc's. F/u 2 weeks or prn. Reviewed RSV management options. Pt has done tours at [facility]. She plans epidural and has no questions regarding the tour information.     Elevated one hour: passed 3 hour. Recommend avoiding sugars/concentrated carbohydrates. Growth u/s scheduled around 33 weeks.

## 2024-12-06 ENCOUNTER — ROUTINE PRENATAL (OUTPATIENT)
Dept: OBSTETRICS AND GYNECOLOGY | Age: 24
End: 2024-12-06
Payer: COMMERCIAL

## 2024-12-06 VITALS — DIASTOLIC BLOOD PRESSURE: 60 MMHG | WEIGHT: 168 LBS | BODY MASS INDEX: 31.74 KG/M2 | SYSTOLIC BLOOD PRESSURE: 110 MMHG

## 2024-12-06 DIAGNOSIS — Z3A.31 31 WEEKS GESTATION OF PREGNANCY: Primary | ICD-10-CM

## 2024-12-06 LAB
CLARITY, POC: CLEAR
COLOR UR: YELLOW
GLUCOSE UR STRIP-MCNC: NEGATIVE MG/DL
PROT UR STRIP-MCNC: NEGATIVE MG/DL

## 2024-12-06 NOTE — PROGRESS NOTES
Pt presents for routine visit. She denies any issues. She is feeling fetal movement. She denies reg ctx, vag bleeding/leaking fluid. She denies headache/vision changes.     A/p: 31 weeks: reviewed PTL and preeclamptic warnings. Continue daily fmc's. U/s for growth at f/u. Reviewed RSV management and pt plans vaccine at f/u.

## 2024-12-20 ENCOUNTER — TELEPHONE (OUTPATIENT)
Dept: OBSTETRICS AND GYNECOLOGY | Age: 24
End: 2024-12-20
Payer: COMMERCIAL

## 2024-12-20 ENCOUNTER — ROUTINE PRENATAL (OUTPATIENT)
Dept: OBSTETRICS AND GYNECOLOGY | Age: 24
End: 2024-12-20
Payer: COMMERCIAL

## 2024-12-20 VITALS — WEIGHT: 168.8 LBS | BODY MASS INDEX: 31.89 KG/M2 | SYSTOLIC BLOOD PRESSURE: 110 MMHG | DIASTOLIC BLOOD PRESSURE: 60 MMHG

## 2024-12-20 DIAGNOSIS — O36.60X0 EXCESSIVE FETAL GROWTH AFFECTING MANAGEMENT OF PREGNANCY, ANTEPARTUM, SINGLE OR UNSPECIFIED FETUS: ICD-10-CM

## 2024-12-20 DIAGNOSIS — Z3A.33 33 WEEKS GESTATION OF PREGNANCY: Primary | ICD-10-CM

## 2024-12-20 DIAGNOSIS — Z13.89 SCREENING FOR BLOOD OR PROTEIN IN URINE: ICD-10-CM

## 2024-12-20 LAB
CLARITY, POC: ABNORMAL
COLOR UR: YELLOW
GLUCOSE UR STRIP-MCNC: NEGATIVE MG/DL
PROT UR STRIP-MCNC: NEGATIVE MG/DL

## 2024-12-20 RX ORDER — BLOOD-GLUCOSE METER
1 KIT MISCELLANEOUS AS NEEDED
Qty: 1 EACH | Refills: 0 | Status: SHIPPED | OUTPATIENT
Start: 2024-12-20

## 2024-12-20 NOTE — PROGRESS NOTES
Pt presents for OB visit. She denies vaginal bleeding/leaking fluid or reg ctx. She is feeling fetal movement. She denies significant headaches.     O: u/s: growth 92 % AC 96 %, shmuel 20.1    A/p: LGA: will start glucose monitoring and supplies sent to pharmacy. Reviewed instructions with pt. F/u 1 wk with log.     33 weeks: reviewed PTL and preeclamptic warnings. Continue daily fmc's. Pt had RSV vaccine today.

## 2024-12-20 NOTE — TELEPHONE ENCOUNTER
Pt notified glucose monitoring supplies were changed due to insurance coverage.  Pt verbalized understanding.

## 2024-12-27 ENCOUNTER — ROUTINE PRENATAL (OUTPATIENT)
Dept: OBSTETRICS AND GYNECOLOGY | Age: 24
End: 2024-12-27
Payer: COMMERCIAL

## 2024-12-27 VITALS — DIASTOLIC BLOOD PRESSURE: 70 MMHG | BODY MASS INDEX: 32.27 KG/M2 | SYSTOLIC BLOOD PRESSURE: 116 MMHG | WEIGHT: 170.8 LBS

## 2024-12-27 DIAGNOSIS — Z13.89 SCREENING FOR HEMATURIA OR PROTEINURIA: ICD-10-CM

## 2024-12-27 DIAGNOSIS — Z3A.34 34 WEEKS GESTATION OF PREGNANCY: Primary | ICD-10-CM

## 2024-12-27 DIAGNOSIS — O12.13 PROTEINURIA AFFECTING PREGNANCY IN THIRD TRIMESTER: ICD-10-CM

## 2024-12-27 LAB
CLARITY, POC: CLEAR
COLOR UR: YELLOW
GLUCOSE UR STRIP-MCNC: NEGATIVE MG/DL
PROT UR STRIP-MCNC: ABNORMAL MG/DL

## 2024-12-27 NOTE — PROGRESS NOTES
Pt presents for scheduled visit. She denies any issues today. She is feeling fetal movement. She denies headache/vision changes. She denies ctx, vag bleeding/leaking fluid     O: REPEAT BP by /75  Ext: no edema or cords    A/p: 34 weeks: reviewed PTL and preeclamptic warnings. Continue daily fmc's    LGA:  pt is checking glucose levels and overall normal. Fasting values are 88 to 92. PP values are 81 to 139. Most values are under 120. Will continue daily fmc's, growth surveillance and glucose monitoring.     Proteinuria: BP is normal and pt denies symptoms. Check CBC, CMP, urine protein / creatinine ratio. Plan BP check and visit 3 days. Reviewed preeclamptic warnings.

## 2024-12-28 LAB
ALBUMIN SERPL-MCNC: 3.6 G/DL (ref 3.5–5.2)
ALBUMIN/GLOB SERPL: 1.4 G/DL
ALP SERPL-CCNC: 97 U/L (ref 39–117)
ALT SERPL-CCNC: 11 U/L (ref 1–33)
AST SERPL-CCNC: 13 U/L (ref 1–32)
BASOPHILS # BLD AUTO: 0.01 10*3/MM3 (ref 0–0.2)
BASOPHILS NFR BLD AUTO: 0.1 % (ref 0–1.5)
BILIRUB SERPL-MCNC: <0.2 MG/DL (ref 0–1.2)
BUN SERPL-MCNC: 7 MG/DL (ref 6–20)
BUN/CREAT SERPL: 10.8 (ref 7–25)
CALCIUM SERPL-MCNC: 9.1 MG/DL (ref 8.6–10.5)
CHLORIDE SERPL-SCNC: 104 MMOL/L (ref 98–107)
CO2 SERPL-SCNC: 24.1 MMOL/L (ref 22–29)
CREAT SERPL-MCNC: 0.65 MG/DL (ref 0.57–1)
CREAT UR-MCNC: 198.4 MG/DL
EGFRCR SERPLBLD CKD-EPI 2021: 127.8 ML/MIN/1.73
EOSINOPHIL # BLD AUTO: 0.03 10*3/MM3 (ref 0–0.4)
EOSINOPHIL NFR BLD AUTO: 0.3 % (ref 0.3–6.2)
ERYTHROCYTE [DISTWIDTH] IN BLOOD BY AUTOMATED COUNT: 12.6 % (ref 12.3–15.4)
GLOBULIN SER CALC-MCNC: 2.6 GM/DL
GLUCOSE SERPL-MCNC: 84 MG/DL (ref 65–99)
HCT VFR BLD AUTO: 34.4 % (ref 34–46.6)
HGB BLD-MCNC: 11.6 G/DL (ref 12–15.9)
IMM GRANULOCYTES # BLD AUTO: 0.11 10*3/MM3 (ref 0–0.05)
IMM GRANULOCYTES NFR BLD AUTO: 0.9 % (ref 0–0.5)
LYMPHOCYTES # BLD AUTO: 2.02 10*3/MM3 (ref 0.7–3.1)
LYMPHOCYTES NFR BLD AUTO: 17 % (ref 19.6–45.3)
MCH RBC QN AUTO: 30.9 PG (ref 26.6–33)
MCHC RBC AUTO-ENTMCNC: 33.7 G/DL (ref 31.5–35.7)
MCV RBC AUTO: 91.5 FL (ref 79–97)
MONOCYTES # BLD AUTO: 0.95 10*3/MM3 (ref 0.1–0.9)
MONOCYTES NFR BLD AUTO: 8 % (ref 5–12)
NEUTROPHILS # BLD AUTO: 8.78 10*3/MM3 (ref 1.7–7)
NEUTROPHILS NFR BLD AUTO: 73.7 % (ref 42.7–76)
NRBC BLD AUTO-RTO: 0 /100 WBC (ref 0–0.2)
PLATELET # BLD AUTO: 178 10*3/MM3 (ref 140–450)
POTASSIUM SERPL-SCNC: 3.9 MMOL/L (ref 3.5–5.2)
PROT SERPL-MCNC: 6.2 G/DL (ref 6–8.5)
PROT UR-MCNC: 20.3 MG/DL
PROT/CREAT UR: 102.3 MG/G CREA (ref 0–200)
RBC # BLD AUTO: 3.76 10*6/MM3 (ref 3.77–5.28)
SODIUM SERPL-SCNC: 138 MMOL/L (ref 136–145)
WBC # BLD AUTO: 11.9 10*3/MM3 (ref 3.4–10.8)

## 2024-12-30 ENCOUNTER — ROUTINE PRENATAL (OUTPATIENT)
Dept: OBSTETRICS AND GYNECOLOGY | Age: 24
End: 2024-12-30
Payer: COMMERCIAL

## 2024-12-30 VITALS — WEIGHT: 169 LBS | SYSTOLIC BLOOD PRESSURE: 116 MMHG | DIASTOLIC BLOOD PRESSURE: 76 MMHG | BODY MASS INDEX: 31.93 KG/M2

## 2024-12-30 DIAGNOSIS — O12.13 PROTEINURIA AFFECTING PREGNANCY IN THIRD TRIMESTER: ICD-10-CM

## 2024-12-30 DIAGNOSIS — O99.019 MATERNAL ANEMIA IN PREGNANCY, ANTEPARTUM: ICD-10-CM

## 2024-12-30 DIAGNOSIS — Z3A.34 34 WEEKS GESTATION OF PREGNANCY: Primary | ICD-10-CM

## 2024-12-30 LAB
GLUCOSE UR STRIP-MCNC: NEGATIVE MG/DL
PROT UR STRIP-MCNC: ABNORMAL MG/DL

## 2024-12-30 PROCEDURE — 0502F SUBSEQUENT PRENATAL CARE: CPT | Performed by: OBSTETRICS & GYNECOLOGY

## 2024-12-30 RX ORDER — BLOOD-GLUCOSE METER
KIT MISCELLANEOUS
COMMUNITY
Start: 2024-12-20

## 2024-12-30 RX ORDER — FERROUS SULFATE 325(65) MG
325 TABLET ORAL
Qty: 30 TABLET | Refills: 3 | Status: SHIPPED | OUTPATIENT
Start: 2024-12-30

## 2024-12-30 NOTE — PROGRESS NOTES
Pt presents for BP check. She denies headache or swelling. She is feeling fetal movement but notes it seems slightly less than prior.    She is monitoring glucose values. All fasting values are less than 95. Most PP values are less than 120.     A/p: proteinuria: protein/creat raio in urin was normal. BP remains normal. Will continue preeclamptic warnings and f/u as scheduled 1/3 or prn.     Decreased fetal movement: pt notes activity is still regular. Will check BPP today, if reassuring, advised daily fmc's and and start weekly BPP 1/3 as booked.     34 weeks: reviewed PTL and preeclamptic warnings    Mild anemia: rx iron    LGA: glucose values overall normal. Advise she can space to several days per week.

## 2025-01-03 ENCOUNTER — ROUTINE PRENATAL (OUTPATIENT)
Dept: OBSTETRICS AND GYNECOLOGY | Age: 25
End: 2025-01-03
Payer: COMMERCIAL

## 2025-01-03 VITALS — WEIGHT: 170 LBS | DIASTOLIC BLOOD PRESSURE: 78 MMHG | SYSTOLIC BLOOD PRESSURE: 114 MMHG | BODY MASS INDEX: 32.12 KG/M2

## 2025-01-03 DIAGNOSIS — O40.3XX0 POLYHYDRAMNIOS IN THIRD TRIMESTER COMPLICATION, SINGLE OR UNSPECIFIED FETUS: ICD-10-CM

## 2025-01-03 DIAGNOSIS — Z13.89 SCREENING FOR BLOOD OR PROTEIN IN URINE: ICD-10-CM

## 2025-01-03 DIAGNOSIS — Z36.85 ANTENATAL SCREENING FOR STREPTOCOCCUS B: ICD-10-CM

## 2025-01-03 DIAGNOSIS — Z3A.35 35 WEEKS GESTATION OF PREGNANCY: Primary | ICD-10-CM

## 2025-01-03 DIAGNOSIS — O99.019 MATERNAL ANEMIA IN PREGNANCY, ANTEPARTUM: ICD-10-CM

## 2025-01-03 LAB
BILIRUB BLD-MCNC: NEGATIVE MG/DL
GLUCOSE UR STRIP-MCNC: NEGATIVE MG/DL
KETONES UR QL: ABNORMAL
LEUKOCYTE EST, POC: ABNORMAL
NITRITE UR-MCNC: NEGATIVE MG/ML
PH UR: 7 [PH] (ref 5–8)
PROT UR STRIP-MCNC: ABNORMAL MG/DL
RBC # UR STRIP: NEGATIVE /UL
SP GR UR: 1.02 (ref 1–1.03)
UROBILINOGEN UR QL: ABNORMAL

## 2025-01-03 NOTE — PROGRESS NOTES
Chief Complaint   Patient presents with    Routine Prenatal Visit     Ob check- 35w2d feeling well, reports good fm, utd on vaccines, no ob complaints       HPI: 24 y.o.  at 35w2d presents for OB follow-up.  Patient of Dr. Nuñez  She is doing well today.   Denies loss of fluid, vaginal bleeding, and contractions.    Endorses fetal movements.  Glucose logs normal.  BPP today is reassuring 8 out of 8. Mild polyhydramnios, DVP 8.04 cm, MARCELO 21.33 cm.     Relevant data reviewed:  US Fetal Biophysical Profile;Without Non-Stress Testing (2025 14:03)   US Fetal Biophysical Profile;Without Non-Stress Testing (2024 09:56)     Last OB US Data (since 2024)         Value Time User    EFW%ILE  92%ile 2024  2:23 PM Alexa Nuñez MD    AC%ILE  96%ile 2024  2:23 PM Alexa Nuñez MD    Fetal Survey  NL/Complete 10/11/2024  2:13 PM Alexa Nuñez MD    Placenta location  Posterior 1/3/2025  6:05 PM Alexa Nuñez MD          Vitals:    25 1416   BP: 114/78   Weight: 77.1 kg (170 lb)     Total weight gain for pregnancy:  8.618 kg (19 lb)    Review of systems:   Gen: negative  CV:     negative  GI: negative  :   negative and good fetal movement noted   MS:    negative  Neuro: negative  Pul: negative    Physical Exam  Constitutional:       General: She is not in acute distress.  Cardiovascular:      Rate and Rhythm: Normal rate and regular rhythm.   Pulmonary:      Effort: Pulmonary effort is normal.      Breath sounds: Normal breath sounds.   Abdominal:      Palpations: Abdomen is soft.      Tenderness: There is no abdominal tenderness.   Genitourinary:     Exam position: Lithotomy position.      Labia:         Right: No rash, tenderness or lesion.         Left: No rash, tenderness or lesion.       Comments: GBS swab completed  Skin:     General: Skin is warm.   Neurological:      Mental Status: She is alert and oriented to person, place, and time.   Psychiatric:          Mood and Affect: Mood normal.         Behavior: Behavior normal.         Thought Content: Thought content normal.         Judgment: Judgment normal.         A/P  1. Intrauterine pregnancy at 35w2d   2. Pregnancy Risk:  COMPLICATED    Diagnoses and all orders for this visit:    1. 35 weeks gestation of pregnancy (Primary)    2. Screening for blood or protein in urine  -     POC Urinalysis Dipstick  -     Urine Culture - Urine, Urine, Clean Catch  -     Protein / Creatinine Ratio, Urine - Urine, Clean Catch    3.  screening for streptococcus B  -     Group B Streptococcus Culture - Swab, Vaginal/Rectum    4. Large for dates    5. Maternal anemia in pregnancy, antepartum    6. Polyhydramnios in third trimester complication, single or unspecified fetus        Pre-eclampsia symptoms were discussed and warnings were given.   labor was discussed.  Warnings were provided.  -----------------------  PLAN:   -BPP today is reassuring 8 out of 8. Mild polyhydramnios, DVP 8.04 cm, MARCELO 21.33 cm.   -GBS swab completed today. Will call patient with results and treat accordingly.  -Completing glucose log every few days. All values were normal. Continue monitoring.   -UA shows 30 mg/dL protein and trace leukocytes. Protein / creatinine ratio sent. Urine culture also sent. Will call patient with results and treat accordingly.  Return for Next scheduled follow up. - in 1 week for BPP and OB f/u with Dr. Savannah Kirkland PA-C  1/3/2025 20:21 EST

## 2025-01-04 LAB
CREAT UR-MCNC: 156 MG/DL
PROT UR-MCNC: 20 MG/DL
PROT/CREAT UR: 128.2 MG/G CREA (ref 0–200)

## 2025-01-05 LAB
BACTERIA UR CULT: NORMAL
BACTERIA UR CULT: NORMAL

## 2025-01-07 LAB — B-HEM STREP SPEC QL CULT: NEGATIVE

## 2025-01-10 ENCOUNTER — ROUTINE PRENATAL (OUTPATIENT)
Dept: OBSTETRICS AND GYNECOLOGY | Age: 25
End: 2025-01-10
Payer: COMMERCIAL

## 2025-01-10 VITALS — SYSTOLIC BLOOD PRESSURE: 110 MMHG | DIASTOLIC BLOOD PRESSURE: 60 MMHG | BODY MASS INDEX: 32.69 KG/M2 | WEIGHT: 173 LBS

## 2025-01-10 DIAGNOSIS — Z3A.36 36 WEEKS GESTATION OF PREGNANCY: Primary | ICD-10-CM

## 2025-01-10 DIAGNOSIS — Z13.89 SCREENING FOR BLOOD OR PROTEIN IN URINE: ICD-10-CM

## 2025-01-10 NOTE — PROGRESS NOTES
Pt presents for OB visit. She is feeling active fetal movement. She denies bleeding/leaking fluid. She is having some ctx and pressure.     U/s: BPP 8/8, shmuel 26.6, DVP 9    A/p: LGA with mild polyhydramnios: glucose log reviewed and values are overall normal. Continue growth surveillance and weekly BPP. Advised daily fmc's. Reviewed risks associated with LGA to include shoulder dystocia with permanent neurologic injury or death possible. Also discussed increased risk c/s, infection, hemorrhage. Pt notes she understands but desires vaginal delivery if possible.     36 weeks: reviewed labor and preeclamptic warnings. Continue daily fmc's.     Mild anemia: pt will  iron today.

## 2025-01-16 PROBLEM — O40.3XX0 POLYHYDRAMNIOS IN THIRD TRIMESTER: Status: ACTIVE | Noted: 2025-01-16

## 2025-01-17 ENCOUNTER — ROUTINE PRENATAL (OUTPATIENT)
Dept: OBSTETRICS AND GYNECOLOGY | Age: 25
End: 2025-01-17
Payer: COMMERCIAL

## 2025-01-17 VITALS — SYSTOLIC BLOOD PRESSURE: 116 MMHG | WEIGHT: 173 LBS | DIASTOLIC BLOOD PRESSURE: 78 MMHG | BODY MASS INDEX: 32.69 KG/M2

## 2025-01-17 DIAGNOSIS — Z13.89 SCREENING FOR HEMATURIA OR PROTEINURIA: ICD-10-CM

## 2025-01-17 DIAGNOSIS — O40.3XX0 POLYHYDRAMNIOS IN THIRD TRIMESTER COMPLICATION, SINGLE OR UNSPECIFIED FETUS: ICD-10-CM

## 2025-01-17 DIAGNOSIS — Z3A.37 37 WEEKS GESTATION OF PREGNANCY: ICD-10-CM

## 2025-01-17 DIAGNOSIS — O99.019 MATERNAL ANEMIA IN PREGNANCY, ANTEPARTUM: Primary | ICD-10-CM

## 2025-01-17 LAB
GLUCOSE UR STRIP-MCNC: NEGATIVE MG/DL
PROT UR STRIP-MCNC: NEGATIVE MG/DL

## 2025-01-17 NOTE — PROGRESS NOTES
Pt presents for scheduled visit. Pt is feeling fetal movement. She denies reg ctx, vag bleeding/leaking fluid.     U/s: efw 8 lb 4 oz ( 95 %, AC > 99 %); BPP 8/8, marcelo 18    A/p: large for dates: glucose log reviewed and values are overall normal. reviewed options. Discussed EFW with pt. As less than 9 1/2 lbs trial of labor is not contraindicated but risks of hemorrhage, c/s for FTP/infection and shoulder dystocia are increased. Discussed shoulder dystocia can result in permanent neurologic injury or death. Discussed that severe injury is not common. Pt desires vaginal delivery. Reviewed options. As cervix remains unfavorable and MARCELO is normal with only mildly increased DVP, will continue to follow for now.     Polyhydramnios: MARCELO normal but DVP slightly increased. Continue daily fmc's and weekly BPP    37 weeks: reviewed labor and preeclamptic warnings. Continue daily fmc's.

## 2025-01-24 ENCOUNTER — ROUTINE PRENATAL (OUTPATIENT)
Dept: OBSTETRICS AND GYNECOLOGY | Age: 25
End: 2025-01-24
Payer: COMMERCIAL

## 2025-01-24 VITALS — DIASTOLIC BLOOD PRESSURE: 86 MMHG | WEIGHT: 174.2 LBS | SYSTOLIC BLOOD PRESSURE: 120 MMHG | BODY MASS INDEX: 32.91 KG/M2

## 2025-01-24 DIAGNOSIS — O40.3XX0 POLYHYDRAMNIOS IN THIRD TRIMESTER COMPLICATION, SINGLE OR UNSPECIFIED FETUS: ICD-10-CM

## 2025-01-24 DIAGNOSIS — Z13.89 SCREENING FOR HEMATURIA OR PROTEINURIA: ICD-10-CM

## 2025-01-24 DIAGNOSIS — Z3A.38 38 WEEKS GESTATION OF PREGNANCY: Primary | ICD-10-CM

## 2025-01-24 NOTE — PROGRESS NOTES
Pt presents for scheduled visit. She has cramping but no regular ctx, vag bleeding/leaking fluid. She denies severe headache/vision changes. She is feeling fetal movement.     U/s: BPP 8/8, shmuel 19.8, DVP 6.9    A/p: LGA: shmuel and DVP improved today. BPP reassuring. Reviewed glucose log and values remain mostly normal with only rare mild elevation. Risks of LGA have been reviewed including shoulder dystocia with risks of neurologic injury or death. Increased risks of c/s for failure to progress, hemorrhage, infection discussed. Pt declines c/s and desires IOL is she does not have spontaneous labor. Reviewed options and pt agrees with cervidil next week. Reviewed IOL process. Pt aware it can be prolonged.     Polyhydramnios: improved today. Continue daily fmc's and plan IOL next week.     38 wks: reviewed labor and preeclamptic warnings. Continue daily fmc's.

## 2025-01-29 ENCOUNTER — ANESTHESIA (OUTPATIENT)
Dept: LABOR AND DELIVERY | Facility: HOSPITAL | Age: 25
End: 2025-01-29
Payer: COMMERCIAL

## 2025-01-29 ENCOUNTER — ANESTHESIA EVENT (OUTPATIENT)
Dept: LABOR AND DELIVERY | Facility: HOSPITAL | Age: 25
End: 2025-01-29
Payer: COMMERCIAL

## 2025-01-29 ENCOUNTER — HOSPITAL ENCOUNTER (INPATIENT)
Facility: HOSPITAL | Age: 25
LOS: 4 days | Discharge: HOME | End: 2025-02-02
Attending: OBSTETRICS & GYNECOLOGY | Admitting: OBSTETRICS & GYNECOLOGY
Payer: COMMERCIAL

## 2025-01-29 DIAGNOSIS — Z98.891 S/P CESAREAN SECTION: Primary | ICD-10-CM

## 2025-01-29 PROBLEM — Z34.90 PREGNANCY: Status: ACTIVE | Noted: 2025-01-29

## 2025-01-29 LAB
ABO GROUP BLD: NORMAL
ALBUMIN SERPL-MCNC: 3.5 G/DL (ref 3.5–5.2)
ALBUMIN/GLOB SERPL: 1.2 G/DL
ALP SERPL-CCNC: 157 U/L (ref 39–117)
ALT SERPL W P-5'-P-CCNC: 10 U/L (ref 1–33)
ANION GAP SERPL CALCULATED.3IONS-SCNC: 14.5 MMOL/L (ref 5–15)
AST SERPL-CCNC: 15 U/L (ref 1–32)
BASOPHILS # BLD AUTO: 0.02 10*3/MM3 (ref 0–0.2)
BASOPHILS NFR BLD AUTO: 0.2 % (ref 0–1.5)
BILIRUB SERPL-MCNC: <0.2 MG/DL (ref 0–1.2)
BLD GP AB SCN SERPL QL: NEGATIVE
BUN SERPL-MCNC: 7 MG/DL (ref 6–20)
BUN/CREAT SERPL: 10.3 (ref 7–25)
CALCIUM SPEC-SCNC: 8.9 MG/DL (ref 8.6–10.5)
CHLORIDE SERPL-SCNC: 103 MMOL/L (ref 98–107)
CO2 SERPL-SCNC: 19.5 MMOL/L (ref 22–29)
CREAT SERPL-MCNC: 0.68 MG/DL (ref 0.57–1)
DEPRECATED RDW RBC AUTO: 41.9 FL (ref 37–54)
EGFRCR SERPLBLD CKD-EPI 2021: 126.5 ML/MIN/1.73
EOSINOPHIL # BLD AUTO: 0.04 10*3/MM3 (ref 0–0.4)
EOSINOPHIL NFR BLD AUTO: 0.3 % (ref 0.3–6.2)
ERYTHROCYTE [DISTWIDTH] IN BLOOD BY AUTOMATED COUNT: 12.9 % (ref 12.3–15.4)
GLOBULIN UR ELPH-MCNC: 2.9 GM/DL
GLUCOSE SERPL-MCNC: 90 MG/DL (ref 65–99)
HCT VFR BLD AUTO: 38.9 % (ref 34–46.6)
HGB BLD-MCNC: 12.8 G/DL (ref 12–15.9)
IMM GRANULOCYTES # BLD AUTO: 0.08 10*3/MM3 (ref 0–0.05)
IMM GRANULOCYTES NFR BLD AUTO: 0.7 % (ref 0–0.5)
LYMPHOCYTES # BLD AUTO: 2.49 10*3/MM3 (ref 0.7–3.1)
LYMPHOCYTES NFR BLD AUTO: 21.2 % (ref 19.6–45.3)
MCH RBC QN AUTO: 29.8 PG (ref 26.6–33)
MCHC RBC AUTO-ENTMCNC: 32.9 G/DL (ref 31.5–35.7)
MCV RBC AUTO: 90.7 FL (ref 79–97)
MONOCYTES # BLD AUTO: 0.91 10*3/MM3 (ref 0.1–0.9)
MONOCYTES NFR BLD AUTO: 7.8 % (ref 5–12)
NEUTROPHILS NFR BLD AUTO: 69.8 % (ref 42.7–76)
NEUTROPHILS NFR BLD AUTO: 8.19 10*3/MM3 (ref 1.7–7)
NRBC BLD AUTO-RTO: 0 /100 WBC (ref 0–0.2)
PLATELET # BLD AUTO: 162 10*3/MM3 (ref 140–450)
PMV BLD AUTO: 11.7 FL (ref 6–12)
POTASSIUM SERPL-SCNC: 4.1 MMOL/L (ref 3.5–5.2)
PROT SERPL-MCNC: 6.4 G/DL (ref 6–8.5)
RBC # BLD AUTO: 4.29 10*6/MM3 (ref 3.77–5.28)
RH BLD: POSITIVE
SODIUM SERPL-SCNC: 137 MMOL/L (ref 136–145)
T&S EXPIRATION DATE: NORMAL
TREPONEMA PALLIDUM IGG+IGM AB [PRESENCE] IN SERUM OR PLASMA BY IMMUNOASSAY: NORMAL
WBC NRBC COR # BLD AUTO: 11.73 10*3/MM3 (ref 3.4–10.8)

## 2025-01-29 PROCEDURE — 80053 COMPREHEN METABOLIC PANEL: CPT | Performed by: OBSTETRICS & GYNECOLOGY

## 2025-01-29 PROCEDURE — 85025 COMPLETE CBC W/AUTO DIFF WBC: CPT | Performed by: OBSTETRICS & GYNECOLOGY

## 2025-01-29 PROCEDURE — S0260 H&P FOR SURGERY: HCPCS | Performed by: OBSTETRICS & GYNECOLOGY

## 2025-01-29 PROCEDURE — 86900 BLOOD TYPING SEROLOGIC ABO: CPT | Performed by: OBSTETRICS & GYNECOLOGY

## 2025-01-29 PROCEDURE — 86850 RBC ANTIBODY SCREEN: CPT | Performed by: OBSTETRICS & GYNECOLOGY

## 2025-01-29 PROCEDURE — 86901 BLOOD TYPING SEROLOGIC RH(D): CPT | Performed by: OBSTETRICS & GYNECOLOGY

## 2025-01-29 PROCEDURE — 86780 TREPONEMA PALLIDUM: CPT | Performed by: OBSTETRICS & GYNECOLOGY

## 2025-01-29 RX ORDER — BUTORPHANOL TARTRATE 2 MG/ML
2 INJECTION, SOLUTION INTRAMUSCULAR; INTRAVENOUS
Status: DISCONTINUED | OUTPATIENT
Start: 2025-01-29 | End: 2025-01-30

## 2025-01-29 RX ORDER — FAMOTIDINE 10 MG/ML
20 INJECTION, SOLUTION INTRAVENOUS ONCE AS NEEDED
Status: COMPLETED | OUTPATIENT
Start: 2025-01-29 | End: 2025-01-30

## 2025-01-29 RX ORDER — EPHEDRINE SULFATE 50 MG/ML
5 INJECTION, SOLUTION INTRAVENOUS
Status: DISCONTINUED | OUTPATIENT
Start: 2025-01-29 | End: 2025-01-30

## 2025-01-29 RX ORDER — SODIUM CHLORIDE 9 MG/ML
40 INJECTION, SOLUTION INTRAVENOUS AS NEEDED
Status: DISCONTINUED | OUTPATIENT
Start: 2025-01-29 | End: 2025-01-30

## 2025-01-29 RX ORDER — MAGNESIUM CARB/ALUMINUM HYDROX 105-160MG
30 TABLET,CHEWABLE ORAL ONCE AS NEEDED
Status: DISCONTINUED | OUTPATIENT
Start: 2025-01-29 | End: 2025-01-30

## 2025-01-29 RX ORDER — ONDANSETRON 2 MG/ML
4 INJECTION INTRAMUSCULAR; INTRAVENOUS EVERY 6 HOURS PRN
Status: DISCONTINUED | OUTPATIENT
Start: 2025-01-29 | End: 2025-01-30

## 2025-01-29 RX ORDER — SODIUM CHLORIDE, SODIUM LACTATE, POTASSIUM CHLORIDE, CALCIUM CHLORIDE 600; 310; 30; 20 MG/100ML; MG/100ML; MG/100ML; MG/100ML
125 INJECTION, SOLUTION INTRAVENOUS CONTINUOUS
Status: ACTIVE | OUTPATIENT
Start: 2025-01-29 | End: 2025-02-01

## 2025-01-29 RX ORDER — FENTANYL/ROPIVACAINE/NS/PF 2MCG/ML-.2
10 PLASTIC BAG, INJECTION (ML) INJECTION CONTINUOUS
Status: DISCONTINUED | OUTPATIENT
Start: 2025-01-29 | End: 2025-02-02 | Stop reason: HOSPADM

## 2025-01-29 RX ORDER — LIDOCAINE HYDROCHLORIDE 10 MG/ML
0.5 INJECTION, SOLUTION INFILTRATION; PERINEURAL ONCE AS NEEDED
Status: DISCONTINUED | OUTPATIENT
Start: 2025-01-29 | End: 2025-01-30

## 2025-01-29 RX ORDER — ONDANSETRON 2 MG/ML
4 INJECTION INTRAMUSCULAR; INTRAVENOUS ONCE AS NEEDED
Status: COMPLETED | OUTPATIENT
Start: 2025-01-29 | End: 2025-01-30

## 2025-01-29 RX ORDER — TERBUTALINE SULFATE 1 MG/ML
0.25 INJECTION, SOLUTION SUBCUTANEOUS AS NEEDED
Status: DISCONTINUED | OUTPATIENT
Start: 2025-01-29 | End: 2025-01-30

## 2025-01-29 RX ORDER — FAMOTIDINE 10 MG/ML
20 INJECTION, SOLUTION INTRAVENOUS 2 TIMES DAILY PRN
Status: DISCONTINUED | OUTPATIENT
Start: 2025-01-29 | End: 2025-01-30

## 2025-01-29 RX ORDER — SODIUM CHLORIDE 0.9 % (FLUSH) 0.9 %
10 SYRINGE (ML) INJECTION EVERY 12 HOURS SCHEDULED
Status: DISCONTINUED | OUTPATIENT
Start: 2025-01-29 | End: 2025-01-30

## 2025-01-29 RX ORDER — ACETAMINOPHEN 325 MG/1
650 TABLET ORAL EVERY 4 HOURS PRN
Status: DISCONTINUED | OUTPATIENT
Start: 2025-01-29 | End: 2025-01-30

## 2025-01-29 RX ORDER — SODIUM CHLORIDE 0.9 % (FLUSH) 0.9 %
10 SYRINGE (ML) INJECTION AS NEEDED
Status: DISCONTINUED | OUTPATIENT
Start: 2025-01-29 | End: 2025-01-30

## 2025-01-29 RX ORDER — ONDANSETRON 4 MG/1
4 TABLET, ORALLY DISINTEGRATING ORAL EVERY 6 HOURS PRN
Status: DISCONTINUED | OUTPATIENT
Start: 2025-01-29 | End: 2025-01-30

## 2025-01-29 RX ORDER — DIPHENHYDRAMINE HYDROCHLORIDE 50 MG/ML
12.5 INJECTION INTRAMUSCULAR; INTRAVENOUS EVERY 8 HOURS PRN
Status: DISCONTINUED | OUTPATIENT
Start: 2025-01-29 | End: 2025-01-30

## 2025-01-29 RX ORDER — FAMOTIDINE 20 MG/1
20 TABLET, FILM COATED ORAL 2 TIMES DAILY PRN
Status: DISCONTINUED | OUTPATIENT
Start: 2025-01-29 | End: 2025-01-30

## 2025-01-29 RX ADMIN — DINOPROSTONE 10 MG: 10 INSERT VAGINAL at 06:04

## 2025-01-29 RX ADMIN — DINOPROSTONE 10 MG: 10 INSERT VAGINAL at 20:16

## 2025-01-29 NOTE — PROGRESS NOTES
"[facility]  Obstetric Progress Note    Subjective     Patient:    The patient feels mild cramping with ctx. She denies bleeding/leaking fluid. She is feeling fetal movement.     Objective     Vital Signs Range for the last 24 hours  Temp:  [97.9 °F (36.6 °C)-98.7 °F (37.1 °C)] 98.1 °F (36.7 °C)   Temp src: Oral   BP: (113-124)/(74-76) 120/74   Heart Rate:  [] 87   Resp:  [18-20] 18   SpO2:  [100 %] 100 %       Device (Oxygen Therapy): room air   Weight:  [79.1 kg (174 lb 6.1 oz)-79.1 kg (174 lb 6.4 oz)] 79.1 kg (174 lb 6.1 oz)       Flowsheet Rows      Flowsheet Row First Filed Value   Admission Height 154.9 cm (61\") Documented at 01/29/2025 0536   Admission Weight 79.1 kg (174 lb 6.4 oz) Documented at 01/29/2025 0433            Intake/Output last 24 hours:    No intake or output data in the 24 hours ending 01/29/25 1815    Intake/Output this shift:    No intake/output data recorded.    Physical Exam:  General: Patient is comfortable and in no acute distress                     Presentation: vtx   Cervix: Exam by: Method: sterile vaginal exam performed   Dilation: Cervical Dilation (cm): 0-1   Effacement: 60   Station:  -2    Cervidil removed         Fetal Heart Rate Assessment   Method: Fetal HR Assessment Method: external   Beats/min: Fetal HR (beats/min): 125   Baseline: Fetal HR Baseline: normal range   Variability: Fetal HR Variability: moderate (amplitude range 6 to 25 bpm)   Accels: Fetal HR Accelerations: greater than/equal to 15 bpm   Decels: Fetal HR Decelerations: absent   Tracing Category:       Uterine Assessment   Method: Method: external tocotransducer, palpation   Frequency (min): Contraction Frequency (Minutes): 2-5   Ctx Count in 10 min: Contractions in 10 Minutes: 3   Duration:     Intensity: Contraction Intensity: mild by palpation   Intensity by IUPC:     Resting Tone: Uterine Resting Tone: soft by palpation   Resting Tone by IUPC:     [place] Units:         Assessment & Plan       " Pregnancy    Large for dates    Polyhydramnios in third trimester        Assessment:  1.  Intrauterine pregnancy at 39w0d gestation with reactive fetal status.    2.  induction of labor  for polyhydramnios and large for dates  with unfavorable cervix: pt has had minimal progress s/p cervidil. Reviewed options for further management to include cervical balloon, pitocin, cytotec or cervidil. Risks and benefits discussed. Pt desires to rest briefly and proceed with another course of cervidil.   3.  Obstetrical history significant for is non-contributory.  4.  GBS status:   Strep Gp B Culture   Date Value Ref Range Status   2025 Negative Negative Final     Comment:     Centers for Disease Control and Prevention (CDC) and American Congress  of Obstetricians and Gynecologists (ACOG) guidelines for prevention of   group B streptococcal (GBS) disease specify co-collection of  a vaginal and rectal swab specimen to maximize sensitivity of GBS  detection. Per the CDC and ACOG, swabbing both the lower vagina and  rectum substantially increases the yield of detection compared with  sampling the vagina alone.  Penicillin G, ampicillin, or cefazolin are indicated for intrapartum  prophylaxis of  GBS colonization. Reflex susceptibility  testing should be performed prior to use of clindamycin only on GBS  isolates from penicillin-allergic women who are considered a high risk  for anaphylaxis. Treatment with vancomycin without additional testing  is warranted if resistance to clindamycin is noted.         Plan:  Repeat course of cervidil following dinner/rest  2. Plan of care has been reviewed with patient and spouse  3.  Risks, benefits of treatment plan have been discussed.  4.  All questions have been answered.      Alexa Nuñez MD  2025  18:15 EST

## 2025-01-29 NOTE — H&P
[facility]  Obstetric History and Physical    Chief Complaint   Patient presents with    Scheduled Induction     IOL for polyhydraminos and LGA, +FM, denies LOF and bleeding        Subjective     Patient is a 24 y.o. female  currently at 39w0d, who presents for labor induction due to polyhydramnios and large for dates. She is feeling fetal movement. She denies vaginal bleeding or pelvic pain. She denies leaking fluid. She denies fever/chills.     Patient has been counseled regarding suspected large for dates. She is aware the risks of , hemorrhage, infection and shoulder dystocia are associated with large for dates. She has been counseled that shoulder dystocia can result in permanent injury or death to a . She understands risks and desires to proceed with IOL and declines .     Her prenatal care is complicated by   Patient Active Problem List   Diagnosis    Carrier of fragile X syndrome    UTI (urinary tract infection) in pregnancy in first trimester    Elevated glucose tolerance test    Maternal anemia in pregnancy, antepartum    Large for dates    Polyhydramnios in third trimester    Pregnancy      Her previous obstetric/gynecological history is not contributory.     The following portions of the patients history were reviewed and updated as appropriate: current medications, allergies, past medical history, past surgical history, past family history, past social history, and problem list .       Prenatal Information:  Prenatal Results       Initial Prenatal Labs       Test Value Reference Range Date Time    Hemoglobin  12.4 g/dL 11.1 - 15.9 24 1427    Hematocrit  36.5 % 34.0 - 46.6 24 1427    Platelets  210 x10E3/uL 150 - 450 24 1427    Rubella IgG  6.93 index Immune >0.99 24 142    Hepatitis B SAg  Negative  Negative 24 142    Hepatitis C Ab  Non Reactive  Non Reactive 24 142    RPR  Non Reactive  Non Reactive 24 142    T. Pallidum Ab    Non-Reactive  Non-Reactive 01/29/25 0524       Non Reactive  Non Reactive 11/08/24 1244    ABO  AB   01/29/25 0524    Rh  Positive   01/29/25 0524    Antibody Screen  Negative  Negative 08/16/24 1335    HIV  Non Reactive  Non Reactive 07/12/24 1427    Urine Culture  Final report   01/03/25 1641       Final report   08/16/24 1305       Final report   07/20/24 1549       Final report   07/12/24 1415       Final report   06/19/24 1606       Final report   05/30/24 1636       Final report   05/20/24     Gonorrhea  Negative  Negative 08/16/24 1327    Chlamydia  Negative  Negative 08/16/24 1327    TSH        HgB A1c   5.4 % 4.8 - 5.6 07/12/24 1427    Varicella IgG  1,409 index Immune >165 07/12/24 1427    Hemoglobinopathy Fractionation        Hemoglobinopathy (genetic testing) ^ negative   07/12/24     Cystic fibrosis   Negative   07/12/24 1349    Spinal muscular atrophy  Negative   07/12/24 1349    Fragile X                  Fetal testing        Test Value Reference Range Date Time    NIPT ^ low risk   07/12/24     MSAFP  *Screen Negative*   08/16/24 1335    AFP-4                  2nd and 3rd Trimester       Test Value Reference Range Date Time    Hemoglobin (repeated)  12.8 g/dL 12.0 - 15.9 01/29/25 0524       11.6 g/dL 12.0 - 15.9 12/27/24 1632       12.2 g/dL 11.1 - 15.9 11/08/24 1244    Hematocrit (repeated)  38.9 % 34.0 - 46.6 01/29/25 0524       34.4 % 34.0 - 46.6 12/27/24 1632       36.7 % 34.0 - 46.6 11/08/24 1244    Platelets   162 10*3/mm3 140 - 450 01/29/25 0524       178 10*3/mm3 140 - 450 12/27/24 1632       200 x10E3/uL 150 - 450 11/08/24 1244       210 x10E3/uL 150 - 450 07/12/24 1427    1 hour GTT   155 mg/dL 70 - 139 11/08/24 1244    Antibody Screen (repeated)  Negative   01/29/25 0524    3rd TM syphilis scrn (repeated)  RPR         3rd TM syphilis scrn (repeated) TP-Ab  Non-Reactive  Non-Reactive 01/29/25 0524       Non Reactive  Non Reactive 11/08/24 1244    3rd TM syphilis screen TB-Ab (FTA)   Non-Reactive  Non-Reactive 01/29/25 0524       Non Reactive  Non Reactive 11/08/24 1244    Syphilis cascade test TP-Ab (EIA)        Syphilis cascade TPPA        GTT Fasting  87 mg/dL 70 - 94 11/15/24 0811    GTT 1 Hr  169 mg/dL 70 - 179 11/15/24 0811    GTT 2 Hr  129 mg/dL 70 - 154 11/15/24 0811    GTT 3 Hr  95 mg/dL 70 - 139 11/15/24 0811    Group B Strep  Negative  Negative 01/03/25 1635              Other testing        Test Value Reference Range Date Time    Parvo IgG         CMV IgG                   Drug Screening       Test Value Reference Range Date Time    Amphetamine Screen        Barbiturate Screen        Benzodiazepine Screen        Methadone Screen        Phencyclidine Screen        Opiates Screen        THC Screen        Cocaine Screen        Propoxyphene Screen        Buprenorphine Screen        Methamphetamine Screen        Oxycodone Screen        Tricyclic Antidepressants Screen                  Legend    ^: Historical                          External Prenatal Results       Pregnancy Outside Results - Transcribed From Office Records - See Scanned Records For Details       Test Value Date Time    ABO  AB  01/29/25 0524    Rh  Positive  01/29/25 0524    Antibody Screen  Negative  01/29/25 0524       Negative  08/16/24 1335    Varicella IgG  1,409 index 07/12/24 1427    Rubella  6.93 index 07/12/24 1427    Hgb  12.8 g/dL 01/29/25 0524       11.6 g/dL 12/27/24 1632       12.2 g/dL 11/08/24 1244       12.4 g/dL 07/12/24 1427    Hct  38.9 % 01/29/25 0524       34.4 % 12/27/24 1632       36.7 % 11/08/24 1244       36.5 % 07/12/24 1427    HgB A1c   5.4 % 07/12/24 1427    1h GTT  155 mg/dL 11/08/24 1244    3h GTT Fasting  87 mg/dL 11/15/24 0811    3h GTT 1 hour  169 mg/dL 11/15/24 0811    3h GTT 2 hour  129 mg/dL 11/15/24 0811    3h GTT 3 hour   95 mg/dL 11/15/24 0811    Gonorrhea (discrete)  Negative  08/16/24 1327    Chlamydia (discrete)  Negative  08/16/24 1327    RPR  Non Reactive  07/12/24 1427     Syphils cascade: TP-Ab (FTA)  Non-Reactive  25 0524       Non Reactive  24 1244    TP-Ab  Non-Reactive  25 0524       Non Reactive  24 1244    TP-Ab (EIA)       TPPA       HBsAg  Negative  24 1427    Herpes Simplex Virus PCR       Herpes Simplex VIrus Culture       HIV  Non Reactive  24 1427    Hep C RNA Quant PCR       Hep C Antibody  Non Reactive  24 1427    AFP  40.0 ng/mL 24 1335    NIPT ^ low risk  24     Cystic Fibrosis (Leeanna)  Negative  24 1349    Cystic Fibroisis  ^ negative  24     Spinal Muscular atrophy  Negative  24 1349    Fragile X       Group B Strep  Negative  25 1635    GBS Susceptibility to Clindamycin       GBS Susceptibility to Erythromycin       Fetal Fibronectin       Genetic Testing, Maternal Blood                 Drug Screening       Test Value Date Time    Urine Drug Screen       Amphetamine Screen       Barbiturate Screen       Benzodiazepine Screen       Methadone Screen       Phencyclidine Screen       Opiates Screen       THC Screen       Cocaine Screen       Propoxyphene Screen       Buprenorphine Screen       Methamphetamine Screen       Oxycodone Screen       Tricyclic Antidepressants Screen                 Legend    ^: Historical                             Past OB History:     OB History    Para Term  AB Living   1 0 0 0 0 0   SAB IAB Ectopic Molar Multiple Live Births   0 0 0 0 0 0      # Outcome Date GA Lbr Santana/2nd Weight Sex Type Anes PTL Lv   1 Current                Past Medical History: Past Medical History:   Diagnosis Date    No pertinent past medical history       Past Surgical History Past Surgical History:   Procedure Laterality Date    TONSILLECTOMY      WISDOM TOOTH EXTRACTION        Family History: Family History   Problem Relation Age of Onset    Diabetes Father     Hypertension Father     Stroke Mother     Breast cancer Neg Hx     Ovarian cancer Neg Hx     Uterine cancer Neg  Hx     Colon cancer Neg Hx       Social History:  reports that she has never smoked. She has never been exposed to tobacco smoke. She has never used smokeless tobacco.   reports no history of alcohol use.   reports no history of drug use.        General ROS: Pertinent items are noted in HPI    Objective       Vital Signs Range for the last 24 hours  Temperature: Temp:  [98.7 °F (37.1 °C)] 98.7 °F (37.1 °C)   Temp Source: Temp src: Oral   BP: BP: (124)/(76) 124/76   Pulse: Heart Rate:  [103] 103   Respirations: Resp:  [20] 20   SPO2: SpO2:  [100 %] 100 %   O2 Amount (l/min):     O2 Devices Device (Oxygen Therapy): room air   Weight: Weight:  [79.1 kg (174 lb 6.1 oz)-79.1 kg (174 lb 6.4 oz)] 79.1 kg (174 lb 6.1 oz)     Physical Examination: General appearance - alert, well appearing, and in no distress  Mental status - alert, oriented to person, place, and time  Chest - clear to auscultation, no wheezes, rales or rhonchi, symmetric air entry  Heart - normal rate and regular rhythm  Abdomen - soft, nontender  Neurological - alert, oriented, normal speech, no focal findings or movement disorder noted  Extremities - bilateral lower ext are without cords or tenderness; trace edema noted    Presentation: Vtx confirmed with bedside u/s   Cervix: Exam by: Method: sterile vaginal exam performed   Dilation: Cervical Dilation (cm): 0-1   Effacement: Cervical Effacement: 50   Station:         Fetal Heart Rate Assessment   Method: Fetal HR Assessment Method: external   Beats/min: Fetal HR (beats/min): 130   Baseline: Fetal HR Baseline: normal range   Variability: Fetal HR Variability: moderate (amplitude range 6 to 25 bpm)   Accels: Fetal HR Accelerations: greater than/equal to 15 bpm   Decels: Fetal HR Decelerations: absent   Tracing Category:       Uterine Assessment   Method: Method: external tocotransducer   Frequency (min): Contraction Frequency (Minutes): x3   Ctx Count in 10 min:     Duration:     Intensity: Contraction  Intensity: no contractions   Intensity by IUPC:     Resting Tone: Uterine Resting Tone: soft by palpation   Resting Tone by IUPC:     [place] Units:           Assessment & Plan       Pregnancy    Large for dates    Polyhydramnios in third trimester        Assessment:  1.  Intrauterine pregnancy at 39w0d gestation with reactive fetal status.    2.  induction of labor  for polyhydramnios/large for dates  with unfavorable cervix  3.  Obstetrical history significant for is non-contributory.  4.  GBS status:   Strep Gp B Culture   Date Value Ref Range Status   2025 Negative Negative Final     Comment:     Centers for Disease Control and Prevention (CDC) and American Congress  of Obstetricians and Gynecologists (ACOG) guidelines for prevention of   group B streptococcal (GBS) disease specify co-collection of  a vaginal and rectal swab specimen to maximize sensitivity of GBS  detection. Per the CDC and ACOG, swabbing both the lower vagina and  rectum substantially increases the yield of detection compared with  sampling the vagina alone.  Penicillin G, ampicillin, or cefazolin are indicated for intrapartum  prophylaxis of  GBS colonization. Reflex susceptibility  testing should be performed prior to use of clindamycin only on GBS  isolates from penicillin-allergic women who are considered a high risk  for anaphylaxis. Treatment with vancomycin without additional testing  is warranted if resistance to clindamycin is noted.         Plan:  1. fetal and uterine monitoring  continuously and cervical ripening with  Cervidil  2. Plan of care has been reviewed with patient and spouse  3.  Risks, benefits of treatment plan have been discussed.  4.  All questions have been answered.      Alexa Nuñez MD  2025  09:07 EST

## 2025-01-30 PROBLEM — Z98.891 S/P CESAREAN SECTION: Status: ACTIVE | Noted: 2025-01-30

## 2025-01-30 LAB
ATMOSPHERIC PRESS: 748.4 MMHG
BASE EXCESS BLDCOV CALC-SCNC: -3.4 MMOL/L (ref -30–30)
BDY SITE: NORMAL
CO2 BLDA-SCNC: 23 MMOL/L (ref 23–27)
HCO3 BLDCOV-SCNC: 21.8 MMOL/L
MODALITY: NORMAL
PCO2 BLDCOV: 39 MM HG (ref 35–51.3)
PH BLDCOV: 7.36 PH UNITS (ref 7.26–7.4)
PO2 BLDCOV: 30.3 MM HG (ref 19–39)
SAO2 % BLDCOV: NORMAL %

## 2025-01-30 PROCEDURE — 25010000002 KETOROLAC TROMETHAMINE PER 15 MG: Performed by: OBSTETRICS & GYNECOLOGY

## 2025-01-30 PROCEDURE — 25010000002 AZITHROMYCIN PER 500 MG: Performed by: OBSTETRICS & GYNECOLOGY

## 2025-01-30 PROCEDURE — C1755 CATHETER, INTRASPINAL: HCPCS | Performed by: ANESTHESIOLOGY

## 2025-01-30 PROCEDURE — 25810000003 LACTATED RINGERS PER 1000 ML: Performed by: ANESTHESIOLOGY

## 2025-01-30 PROCEDURE — 25010000002 CEFAZOLIN PER 500 MG: Performed by: OBSTETRICS & GYNECOLOGY

## 2025-01-30 PROCEDURE — 25010000002 LIDOCAINE-EPINEPHRINE (PF) 2 %-1:200000 SOLUTION: Performed by: ANESTHESIOLOGY

## 2025-01-30 PROCEDURE — 25010000002 PHENYLEPHRINE 10 MG/ML SOLUTION: Performed by: ANESTHESIOLOGY

## 2025-01-30 PROCEDURE — 25010000002 MORPHINE PER 10 MG: Performed by: ANESTHESIOLOGY

## 2025-01-30 PROCEDURE — C1755 CATHETER, INTRASPINAL: HCPCS

## 2025-01-30 PROCEDURE — 10907ZC DRAINAGE OF AMNIOTIC FLUID, THERAPEUTIC FROM PRODUCTS OF CONCEPTION, VIA NATURAL OR ARTIFICIAL OPENING: ICD-10-PCS | Performed by: OBSTETRICS & GYNECOLOGY

## 2025-01-30 PROCEDURE — 25810000003 SODIUM CHLORIDE 0.9 % SOLUTION 250 ML FLEX CONT: Performed by: OBSTETRICS & GYNECOLOGY

## 2025-01-30 PROCEDURE — 59510 CESAREAN DELIVERY: CPT | Performed by: OBSTETRICS & GYNECOLOGY

## 2025-01-30 PROCEDURE — 25810000003 LACTATED RINGERS PER 1000 ML: Performed by: OBSTETRICS & GYNECOLOGY

## 2025-01-30 PROCEDURE — 25810000003 LACTATED RINGERS SOLUTION: Performed by: ANESTHESIOLOGY

## 2025-01-30 PROCEDURE — 25010000002 LIDOCAINE-EPINEPHRINE (PF) 1 %-1:200000 SOLUTION: Performed by: ANESTHESIOLOGY

## 2025-01-30 PROCEDURE — 88307 TISSUE EXAM BY PATHOLOGIST: CPT

## 2025-01-30 PROCEDURE — 82803 BLOOD GASES ANY COMBINATION: CPT | Performed by: OBSTETRICS & GYNECOLOGY

## 2025-01-30 PROCEDURE — 25010000002 ONDANSETRON PER 1 MG: Performed by: ANESTHESIOLOGY

## 2025-01-30 RX ORDER — ACETAMINOPHEN 325 MG/1
650 TABLET ORAL EVERY 6 HOURS
Status: DISCONTINUED | OUTPATIENT
Start: 2025-01-31 | End: 2025-02-02 | Stop reason: HOSPADM

## 2025-01-30 RX ORDER — SODIUM CHLORIDE, SODIUM LACTATE, POTASSIUM CHLORIDE, CALCIUM CHLORIDE 600; 310; 30; 20 MG/100ML; MG/100ML; MG/100ML; MG/100ML
INJECTION, SOLUTION INTRAVENOUS CONTINUOUS PRN
Status: DISCONTINUED | OUTPATIENT
Start: 2025-01-30 | End: 2025-01-30 | Stop reason: SURG

## 2025-01-30 RX ORDER — ONDANSETRON 2 MG/ML
4 INJECTION INTRAMUSCULAR; INTRAVENOUS EVERY 6 HOURS PRN
Status: DISCONTINUED | OUTPATIENT
Start: 2025-01-30 | End: 2025-02-02 | Stop reason: HOSPADM

## 2025-01-30 RX ORDER — METHYLERGONOVINE MALEATE 0.2 MG/ML
200 INJECTION INTRAVENOUS ONCE AS NEEDED
Status: DISCONTINUED | OUTPATIENT
Start: 2025-01-30 | End: 2025-01-30

## 2025-01-30 RX ORDER — OXYCODONE HYDROCHLORIDE 5 MG/1
5 TABLET ORAL EVERY 4 HOURS PRN
Status: DISCONTINUED | OUTPATIENT
Start: 2025-01-30 | End: 2025-02-02 | Stop reason: HOSPADM

## 2025-01-30 RX ORDER — CARBOPROST TROMETHAMINE 250 UG/ML
250 INJECTION, SOLUTION INTRAMUSCULAR
Status: DISCONTINUED | OUTPATIENT
Start: 2025-01-30 | End: 2025-01-30

## 2025-01-30 RX ORDER — PHENYLEPHRINE HYDROCHLORIDE 10 MG/ML
INJECTION INTRAVENOUS AS NEEDED
Status: DISCONTINUED | OUTPATIENT
Start: 2025-01-30 | End: 2025-01-30 | Stop reason: SURG

## 2025-01-30 RX ORDER — OXYTOCIN/0.9 % SODIUM CHLORIDE 30/500 ML
999 PLASTIC BAG, INJECTION (ML) INTRAVENOUS ONCE
Status: DISCONTINUED | OUTPATIENT
Start: 2025-01-30 | End: 2025-01-30 | Stop reason: HOSPADM

## 2025-01-30 RX ORDER — IBUPROFEN 600 MG/1
600 TABLET, FILM COATED ORAL EVERY 6 HOURS
Status: DISCONTINUED | OUTPATIENT
Start: 2025-02-01 | End: 2025-02-02 | Stop reason: HOSPADM

## 2025-01-30 RX ORDER — ACETAMINOPHEN 500 MG
1000 TABLET ORAL ONCE
Status: COMPLETED | OUTPATIENT
Start: 2025-01-30 | End: 2025-01-30

## 2025-01-30 RX ORDER — OXYTOCIN/0.9 % SODIUM CHLORIDE 30/500 ML
250 PLASTIC BAG, INJECTION (ML) INTRAVENOUS CONTINUOUS
Status: ACTIVE | OUTPATIENT
Start: 2025-01-30 | End: 2025-01-30

## 2025-01-30 RX ORDER — LIDOCAINE HCL/EPINEPHRINE/PF 2%-1:200K
VIAL (ML) INJECTION AS NEEDED
Status: DISCONTINUED | OUTPATIENT
Start: 2025-01-30 | End: 2025-01-30 | Stop reason: SURG

## 2025-01-30 RX ORDER — OXYCODONE HYDROCHLORIDE 10 MG/1
10 TABLET ORAL EVERY 4 HOURS PRN
Status: DISCONTINUED | OUTPATIENT
Start: 2025-01-30 | End: 2025-02-02 | Stop reason: HOSPADM

## 2025-01-30 RX ORDER — OXYTOCIN/0.9 % SODIUM CHLORIDE 30/500 ML
125 PLASTIC BAG, INJECTION (ML) INTRAVENOUS ONCE AS NEEDED
Status: COMPLETED | OUTPATIENT
Start: 2025-01-30 | End: 2025-01-30

## 2025-01-30 RX ORDER — SIMETHICONE 80 MG
80 TABLET,CHEWABLE ORAL 4 TIMES DAILY PRN
Status: DISCONTINUED | OUTPATIENT
Start: 2025-01-30 | End: 2025-02-02 | Stop reason: HOSPADM

## 2025-01-30 RX ORDER — KETOROLAC TROMETHAMINE 30 MG/ML
30 INJECTION, SOLUTION INTRAMUSCULAR; INTRAVENOUS ONCE
Status: DISCONTINUED | OUTPATIENT
Start: 2025-01-30 | End: 2025-01-30

## 2025-01-30 RX ORDER — HYDROXYZINE HYDROCHLORIDE 50 MG/1
50 TABLET, FILM COATED ORAL EVERY 6 HOURS PRN
Status: DISCONTINUED | OUTPATIENT
Start: 2025-01-30 | End: 2025-02-02 | Stop reason: HOSPADM

## 2025-01-30 RX ORDER — FAMOTIDINE 10 MG/ML
20 INJECTION, SOLUTION INTRAVENOUS ONCE AS NEEDED
Status: DISCONTINUED | OUTPATIENT
Start: 2025-01-30 | End: 2025-01-30

## 2025-01-30 RX ORDER — HYDROCORTISONE 25 MG/G
CREAM TOPICAL 3 TIMES DAILY PRN
Status: DISCONTINUED | OUTPATIENT
Start: 2025-01-30 | End: 2025-02-02 | Stop reason: HOSPADM

## 2025-01-30 RX ORDER — KETOROLAC TROMETHAMINE 15 MG/ML
15 INJECTION, SOLUTION INTRAMUSCULAR; INTRAVENOUS EVERY 6 HOURS
Status: COMPLETED | OUTPATIENT
Start: 2025-01-30 | End: 2025-01-31

## 2025-01-30 RX ORDER — MORPHINE SULFATE 1 MG/ML
INJECTION, SOLUTION EPIDURAL; INTRATHECAL; INTRAVENOUS AS NEEDED
Status: DISCONTINUED | OUTPATIENT
Start: 2025-01-30 | End: 2025-01-30 | Stop reason: SURG

## 2025-01-30 RX ORDER — OXYTOCIN/0.9 % SODIUM CHLORIDE 30/500 ML
999 PLASTIC BAG, INJECTION (ML) INTRAVENOUS ONCE
Status: COMPLETED | OUTPATIENT
Start: 2025-01-30 | End: 2025-01-30

## 2025-01-30 RX ORDER — OXYTOCIN/0.9 % SODIUM CHLORIDE 30/500 ML
2-20 PLASTIC BAG, INJECTION (ML) INTRAVENOUS
Status: DISCONTINUED | OUTPATIENT
Start: 2025-01-30 | End: 2025-02-02 | Stop reason: HOSPADM

## 2025-01-30 RX ORDER — OXYTOCIN/0.9 % SODIUM CHLORIDE 30/500 ML
250 PLASTIC BAG, INJECTION (ML) INTRAVENOUS CONTINUOUS
Status: DISPENSED | OUTPATIENT
Start: 2025-01-30 | End: 2025-01-30

## 2025-01-30 RX ORDER — ACETAMINOPHEN 500 MG
1000 TABLET ORAL EVERY 6 HOURS
Status: DISPENSED | OUTPATIENT
Start: 2025-01-30 | End: 2025-01-31

## 2025-01-30 RX ORDER — ERYTHROMYCIN 5 MG/G
OINTMENT OPHTHALMIC
Status: ACTIVE
Start: 2025-01-30 | End: 2025-01-31

## 2025-01-30 RX ORDER — TRANEXAMIC ACID 10 MG/ML
1000 INJECTION, SOLUTION INTRAVENOUS ONCE AS NEEDED
Status: DISCONTINUED | OUTPATIENT
Start: 2025-01-30 | End: 2025-02-02 | Stop reason: HOSPADM

## 2025-01-30 RX ORDER — ONDANSETRON 4 MG/1
4 TABLET, ORALLY DISINTEGRATING ORAL EVERY 8 HOURS PRN
Status: DISCONTINUED | OUTPATIENT
Start: 2025-01-30 | End: 2025-02-02 | Stop reason: HOSPADM

## 2025-01-30 RX ORDER — KETOROLAC TROMETHAMINE 30 MG/ML
30 INJECTION, SOLUTION INTRAMUSCULAR; INTRAVENOUS ONCE
Status: COMPLETED | OUTPATIENT
Start: 2025-01-30 | End: 2025-01-30

## 2025-01-30 RX ORDER — MISOPROSTOL 200 UG/1
800 TABLET ORAL ONCE AS NEEDED
Status: DISCONTINUED | OUTPATIENT
Start: 2025-01-30 | End: 2025-01-30

## 2025-01-30 RX ORDER — PHYTONADIONE 1 MG/.5ML
INJECTION, EMULSION INTRAMUSCULAR; INTRAVENOUS; SUBCUTANEOUS
Status: ACTIVE
Start: 2025-01-30 | End: 2025-01-31

## 2025-01-30 RX ADMIN — Medication 125 ML/HR: at 19:54

## 2025-01-30 RX ADMIN — SODIUM CHLORIDE, POTASSIUM CHLORIDE, SODIUM LACTATE AND CALCIUM CHLORIDE 500 ML: 600; 310; 30; 20 INJECTION, SOLUTION INTRAVENOUS at 19:34

## 2025-01-30 RX ADMIN — PHENYLEPHRINE HYDROCHLORIDE 100 MCG: 10 INJECTION INTRAVENOUS at 18:17

## 2025-01-30 RX ADMIN — KETOROLAC TROMETHAMINE 30 MG: 30 INJECTION, SOLUTION INTRAMUSCULAR at 20:47

## 2025-01-30 RX ADMIN — MORPHINE SULFATE 2 MG: 1 INJECTION, SOLUTION EPIDURAL; INTRATHECAL; INTRAVENOUS at 18:34

## 2025-01-30 RX ADMIN — LIDOCAINE HYDROCHLORIDE AND EPINEPHRINE 5 MG: 20; 5 INJECTION, SOLUTION EPIDURAL; INFILTRATION; INTRACAUDAL; PERINEURAL at 18:17

## 2025-01-30 RX ADMIN — PHENYLEPHRINE HYDROCHLORIDE 200 MCG: 10 INJECTION INTRAVENOUS at 18:40

## 2025-01-30 RX ADMIN — PHENYLEPHRINE HYDROCHLORIDE 200 MCG: 10 INJECTION INTRAVENOUS at 18:33

## 2025-01-30 RX ADMIN — Medication 10 ML/HR: at 15:34

## 2025-01-30 RX ADMIN — PHENYLEPHRINE HYDROCHLORIDE 100 MCG: 10 INJECTION INTRAVENOUS at 18:25

## 2025-01-30 RX ADMIN — AZITHROMYCIN MONOHYDRATE 500 MG: 500 INJECTION, POWDER, LYOPHILIZED, FOR SOLUTION INTRAVENOUS at 17:22

## 2025-01-30 RX ADMIN — LIDOCAINE HYDROCHLORIDE AND EPINEPHRINE 5 MG: 20; 5 INJECTION, SOLUTION EPIDURAL; INFILTRATION; INTRACAUDAL; PERINEURAL at 18:11

## 2025-01-30 RX ADMIN — FAMOTIDINE 20 MG: 10 INJECTION INTRAVENOUS at 17:24

## 2025-01-30 RX ADMIN — LIDOCAINE HYDROCHLORIDE 3 ML: 10; .005 INJECTION, SOLUTION EPIDURAL; INFILTRATION; INTRACAUDAL; PERINEURAL at 15:29

## 2025-01-30 RX ADMIN — SODIUM CHLORIDE, POTASSIUM CHLORIDE, SODIUM LACTATE AND CALCIUM CHLORIDE: 600; 310; 30; 20 INJECTION, SOLUTION INTRAVENOUS at 18:10

## 2025-01-30 RX ADMIN — ACETAMINOPHEN 1000 MG: 500 TABLET, FILM COATED ORAL at 17:10

## 2025-01-30 RX ADMIN — Medication 2 MILLI-UNITS/MIN: at 10:05

## 2025-01-30 RX ADMIN — SODIUM CHLORIDE 2 G: 900 INJECTION INTRAVENOUS at 18:12

## 2025-01-30 RX ADMIN — ONDANSETRON 4 MG: 2 INJECTION INTRAMUSCULAR; INTRAVENOUS at 17:24

## 2025-01-30 RX ADMIN — LIDOCAINE HYDROCHLORIDE AND EPINEPHRINE 5 MG: 20; 5 INJECTION, SOLUTION EPIDURAL; INFILTRATION; INTRACAUDAL; PERINEURAL at 18:14

## 2025-01-30 RX ADMIN — SODIUM CHLORIDE, POTASSIUM CHLORIDE, SODIUM LACTATE AND CALCIUM CHLORIDE 999 ML/HR: 600; 310; 30; 20 INJECTION, SOLUTION INTRAVENOUS at 15:15

## 2025-01-30 RX ADMIN — SODIUM CHLORIDE, POTASSIUM CHLORIDE, SODIUM LACTATE AND CALCIUM CHLORIDE 125 ML/HR: 600; 310; 30; 20 INJECTION, SOLUTION INTRAVENOUS at 10:14

## 2025-01-30 RX ADMIN — ACETAMINOPHEN 1000 MG: 500 TABLET, FILM COATED ORAL at 23:30

## 2025-01-30 RX ADMIN — PHENYLEPHRINE HYDROCHLORIDE 100 MCG: 10 INJECTION INTRAVENOUS at 18:19

## 2025-01-30 RX ADMIN — Medication 999 ML/HR: at 18:22

## 2025-01-30 RX ADMIN — PHENYLEPHRINE HYDROCHLORIDE 200 MCG: 10 INJECTION INTRAVENOUS at 18:47

## 2025-01-30 NOTE — ANESTHESIA PREPROCEDURE EVALUATION
Anesthesia Evaluation     Patient summary reviewed and Nursing notes reviewed   no history of anesthetic complications:   NPO Solid Status: > 8 hours  NPO Liquid Status: > 8 hours           Airway   Mallampati: II  TM distance: >3 FB  Neck ROM: full  No difficulty expected  Dental - normal exam     Pulmonary    (-) asthma, sleep apnea, rhonchi, decreased breath sounds, wheezes, not a smoker  Cardiovascular   Exercise tolerance: good (4-7 METS)    Rhythm: regular  Rate: normal    (-) hypertension, CAD, dysrhythmias, angina, PRECIADO, murmur      Neuro/Psych  (-) seizures, CVA  GI/Hepatic/Renal/Endo    (-) liver disease, no renal disease, diabetes, no thyroid disorder    Musculoskeletal     Abdominal     Abdomen: soft.   Substance History      OB/GYN    (+) Pregnant        Other                    Anesthesia Plan    ASA 2     epidural     (39w1d)    Anesthetic plan, risks, benefits, and alternatives have been provided, discussed and informed consent has been obtained with: patient.    CODE STATUS:    Level Of Support Discussed With: Patient  Code Status (Patient has no pulse and is not breathing): CPR (Attempt to Resuscitate)  Medical Interventions (Patient has pulse or is breathing): Full Support

## 2025-01-30 NOTE — PLAN OF CARE
Problem: Adult Inpatient Plan of Care  Goal: Plan of Care Review  Outcome: Progressing  Flowsheets (Taken 1/30/2025 0557)  Progress: improving  Outcome Evaluation: Patient here for IOL for polyhydramnios. Patient is receiving second round of cervical ripening agent. Patient feels contractions more frequently ans stronger through out the night. Patient states it is tolerable. Patient plans for vaginal delivery.  Plan of Care Reviewed With: patient  Goal: Patient-Specific Goal (Individualized)  Outcome: Progressing  Goal: Absence of Hospital-Acquired Illness or Injury  Outcome: Progressing  Intervention: Identify and Manage Fall Risk  Recent Flowsheet Documentation  Taken 1/29/2025 1920 by Chica, Jomar, RN  Safety Promotion/Fall Prevention: safety round/check completed  Goal: Optimal Comfort and Wellbeing  Outcome: Progressing  Intervention: Provide Person-Centered Care  Recent Flowsheet Documentation  Taken 1/29/2025 1920 by Jomar Coto RN  Trust Relationship/Rapport:   care explained   choices provided   questions answered   questions encouraged  Goal: Readiness for Transition of Care  Outcome: Progressing     Problem: Labor  Goal: Hemostasis  Outcome: Progressing  Goal: Stable Fetal Wellbeing  Outcome: Progressing  Goal: Effective Progression to Delivery  Outcome: Progressing  Goal: Absence of Infection Signs and Symptoms  Outcome: Progressing  Goal: Acceptable Pain Control  Outcome: Progressing  Goal: Normal Uterine Contraction Pattern  Outcome: Progressing     Problem: Pain Acute  Goal: Optimal Pain Control and Function  Outcome: Progressing   Goal Outcome Evaluation:  Plan of Care Reviewed With: patient        Progress: improving  Outcome Evaluation: Patient here for IOL for polyhydramnios. Patient is receiving second round of cervical ripening agent. Patient feels contractions more frequently ans stronger through out the night. Patient states it is tolerable. Patient plans for vaginal  delivery.

## 2025-01-30 NOTE — PROGRESS NOTES
[facility]   Obstetrics and Gynecology     2025      Patient:  Giovanna Hickey   MR#:[id number]    Chart note    AROM with clear fluid.      Head is high and cervix is very anterior.  IUPC was very challenging to place.  With epidural in place and thorough exam, pelvic feels abnormal.     The subpubic arch is markedly narrowed and the azeb sidewalls seem to converge anteriorly characteristic of a Android type pelvic.  VE 80/ft/-2    With EFW at 94% and AC>99th on last scan, I feel there is a risk of shoulder dystocia if head will descend.     Additionally with the long course of induction, option for  offered and the patient wants to proceed.    Discussed the procedure and risk/benefits/alteratives for  trial.       Lauri Barney MD   2025 16:57 EST

## 2025-01-30 NOTE — OP NOTE
[facility]   Obstetrics and Gynecology     2025    Patient:Giovanna Hickey   MR#:[id number]     Section Procedure Note    Indications: failed induction    Pre-operative Diagnosis:   Intrauterine pregnancy at 39w1d  Labor status: Induced Onset of Labor    Pregnancy    Large for dates    Polyhydramnios in third trimester    Post-operative Diagnosis: same    Procedure:  Low transverse  section     Surgeon: Lauri Barney MD     Assistants:  EDER DAUGHERTY    Anesthesia: Epidural anesthesia    Prenatal care problem list:  Patient Active Problem List   Diagnosis    Carrier of fragile X syndrome    UTI (urinary tract infection) in pregnancy in first trimester    Elevated glucose tolerance test    Maternal anemia in pregnancy, antepartum    Large for dates    Polyhydramnios in third trimester    Pregnancy       Procedure Details   The patient was seen in the LDR preoperatively. The risks, benefits, complications, treatment options, and expected outcomes were discussed with the patient.  The patient concurred with the proposed plan, giving informed consent.  The site of surgery is discussed. The patient was taken to Operating Room # 1, identified as Giovanna Hickey and the procedure verified as  Delivery. A Time Out was held and the above information confirmed.    After induction of anesthesia, the patient was draped and prepped in the usual sterile manner. A Pfannenstiel incision was made and carried down through the subcutaneous tissue to the fascia. Fascial incision was made and extended transversely. The fascia was  from the underlying rectus tissue superiorly and inferiorly. The peritoneum was identified and entered. Peritoneal incision was extended longitudinally. The utero-vesical peritoneal reflection was incised transversely and the bladder flap was bluntly freed from the lower uterine segment. A low transverse uterine incision was made. Delivered from vertex  "presentation was a male fetus 3780 g (8 lb 5.3 oz) with Apgar scores of 8 at one minute and 9 at five minutes. After the umbilical cord was clamped and cut cord blood was obtained for evaluation. The placenta was removed intact and appeared normal. The uterine outline, tubes and ovaries appeared normal.  The uterine incision was closed with running locked sutures of 0 monocryl. Hemostasis was observed. Lavage was carried out until clear.     Peritoneum was closed with 2-0 Vicryl in a running fashion incorporating the muscle in the closure    The fascia was then reapproximated with running sutures of 0 Vicryl. The deep subcutaneous layer was reapproximated with 3-0 Vicryl in a running fashion. The skin was reapproximated with 3-0 monocryl in a subcuticular fashion.     Instrument, sponge, and needle counts were correct prior the abdominal closure and at the conclusion of the case.     EDER DAUGHERTY was responsible for performing the following activities: Retraction, Suction, Irrigation, Suturing, Closing, and Delivery of Fetus and their skilled assistance was necessary for the success of this case.      Findings:  Live viable male fetus    Estimated Blood Loss:   600 cc    Calculated Blood Loss:              Specimens:  Placenta            Complications:  None; patient tolerated the procedure well.           Disposition: PACU - hemodynamically stable.           Condition: stable    Attending Attestation: I was present and scrubbed for the entire procedure.    Cord gases:  No results found for: \"PHCVEN\", \"BECVEN\"    Lauri Barney MD  2025   18:51 EST  "

## 2025-01-30 NOTE — ANESTHESIA PROCEDURE NOTES
Labor Epidural      Patient reassessed immediately prior to procedure    Patient location during procedure: OB  Start Time: 1/30/2025 3:19 PM  Stop Time: 1/30/2025 3:29 PM  Performed By  Anesthesiologist: Rock Evans MD  Preanesthetic Checklist  Completed: patient identified, IV checked, site marked, risks and benefits discussed, surgical consent, monitors and equipment checked, pre-op evaluation and timeout performed  Prep:  Pt Position:sitting  Sterile Tech:cap, gloves, mask and sterile barrier  Prep:chlorhexidine gluconate and isopropyl alcohol  Monitoring:blood pressure monitoring, continuous pulse oximetry and EKG  Epidural Block Procedure:  Approach:midline  Guidance:landmark technique  Location:L4-L5  Needle Type:Tuohy  Needle Gauge:17 G  Loss of Resistance Medium: saline  Loss of Resistance: 5 (5.5cm)cm  Cath Depth at skin:11 cm  Paresthesia: none  Aspiration:negative  Test Dose:negative  Number of Attempts: 1  Post Assessment:  Dressing:occlusive dressing applied and secured with tape  Pt Tolerance:patient tolerated the procedure well with no apparent complications  Complications:no

## 2025-01-31 LAB
BASOPHILS # BLD AUTO: 0.01 10*3/MM3 (ref 0–0.2)
BASOPHILS NFR BLD AUTO: 0.1 % (ref 0–1.5)
DEPRECATED RDW RBC AUTO: 42.9 FL (ref 37–54)
EOSINOPHIL # BLD AUTO: 0.05 10*3/MM3 (ref 0–0.4)
EOSINOPHIL NFR BLD AUTO: 0.5 % (ref 0.3–6.2)
ERYTHROCYTE [DISTWIDTH] IN BLOOD BY AUTOMATED COUNT: 12.7 % (ref 12.3–15.4)
HCT VFR BLD AUTO: 28.5 % (ref 34–46.6)
HGB BLD-MCNC: 9.1 G/DL (ref 12–15.9)
IMM GRANULOCYTES # BLD AUTO: 0.05 10*3/MM3 (ref 0–0.05)
IMM GRANULOCYTES NFR BLD AUTO: 0.5 % (ref 0–0.5)
LYMPHOCYTES # BLD AUTO: 1.57 10*3/MM3 (ref 0.7–3.1)
LYMPHOCYTES NFR BLD AUTO: 16.6 % (ref 19.6–45.3)
MCH RBC QN AUTO: 29.4 PG (ref 26.6–33)
MCHC RBC AUTO-ENTMCNC: 31.9 G/DL (ref 31.5–35.7)
MCV RBC AUTO: 92.2 FL (ref 79–97)
MONOCYTES # BLD AUTO: 1.05 10*3/MM3 (ref 0.1–0.9)
MONOCYTES NFR BLD AUTO: 11.1 % (ref 5–12)
NEUTROPHILS NFR BLD AUTO: 6.75 10*3/MM3 (ref 1.7–7)
NEUTROPHILS NFR BLD AUTO: 71.2 % (ref 42.7–76)
PLATELET # BLD AUTO: 147 10*3/MM3 (ref 140–450)
PMV BLD AUTO: 11.7 FL (ref 6–12)
RBC # BLD AUTO: 3.09 10*6/MM3 (ref 3.77–5.28)
WBC NRBC COR # BLD AUTO: 9.48 10*3/MM3 (ref 3.4–10.8)

## 2025-01-31 PROCEDURE — 25010000002 KETOROLAC TROMETHAMINE PER 15 MG: Performed by: OBSTETRICS & GYNECOLOGY

## 2025-01-31 PROCEDURE — 85025 COMPLETE CBC W/AUTO DIFF WBC: CPT | Performed by: OBSTETRICS & GYNECOLOGY

## 2025-01-31 RX ADMIN — ACETAMINOPHEN 1000 MG: 500 TABLET, FILM COATED ORAL at 17:09

## 2025-01-31 RX ADMIN — ACETAMINOPHEN 1000 MG: 500 TABLET, FILM COATED ORAL at 06:10

## 2025-01-31 RX ADMIN — KETOROLAC TROMETHAMINE 15 MG: 15 INJECTION, SOLUTION INTRAMUSCULAR; INTRAVENOUS at 21:50

## 2025-01-31 RX ADMIN — KETOROLAC TROMETHAMINE 15 MG: 15 INJECTION, SOLUTION INTRAMUSCULAR; INTRAVENOUS at 09:02

## 2025-01-31 RX ADMIN — KETOROLAC TROMETHAMINE 15 MG: 15 INJECTION, SOLUTION INTRAMUSCULAR; INTRAVENOUS at 03:27

## 2025-01-31 RX ADMIN — KETOROLAC TROMETHAMINE 15 MG: 15 INJECTION, SOLUTION INTRAMUSCULAR; INTRAVENOUS at 15:41

## 2025-01-31 NOTE — PROGRESS NOTES
[facility]   PROGRESS NOTE    Post-Op Day 1 S/P     Subjective   CC: post  section    Patient reports:  Patient reports that her pain is well-controlled with oral medicines.  She has taken some narcotics.  She desires circumcision for her son.  She has not voided yet.  She is passing gas and tolerating a regular diet.  She is ambulating without assistance.      Review of systems- no shortness of breath, nausea or vomiting or leg pain.    Objective      Vitals: Vital Signs Range for the last 24 hours  Temperature: Temp:  [97.9 °F (36.6 °C)-98.8 °F (37.1 °C)] 98 °F (36.7 °C)       BP: BP: ()/(27-89) 99/59   Pulse: Heart Rate:  [] 76   Respirations: Resp:  [15-19] 16                            Physical exam   General-  no acute distress, conversant    Lungs- respirations unlabored   CV- trace LE edema   Abdomen- soft, nontender, nondistended.  Fundus is firm.   Incision -dressing is dry   Lower extremities- nontender bilaterally    Results from last 7 days   Lab Units 25  1037   WBC 10*3/mm3 9.48   HEMOGLOBIN g/dL 9.1*   HEMATOCRIT % 28.5*   PLATELETS 10*3/mm3 147         Assessment & Plan        Pregnancy    Large for dates    Polyhydramnios in third trimester    S/P  section      Assessment:    Giovanna Hickey is Day 1  post-op from      Patient is doing well  She desires circumcision for her son-we discussed circumcision is an elective procedure with a small risk of bleeding, infection or injury to the penis.  Patient wished to proceed     Plan:  continue post op care, pain medication prn and encouraged ambulation.        Gregorio Barcenas MD  2025  11:18 EST

## 2025-01-31 NOTE — PLAN OF CARE
Goal Outcome Evaluation:  Plan of Care Reviewed With: patient        Progress: improving  Outcome Evaluation: Pt is s/p primary c/s for FTP in 1st stage of labor. Pt delivered at 1820;  at bedside and both patient and  are bonding with infant. Pt was hypotensive at the beginning of her recovery, but blood pressures are continuing to rise and improve. Pain well controlled at this time. Fundus is firm, midline and @U, with light rubra lochia; no clots present. RN anticipates transfer to PP unit.

## 2025-01-31 NOTE — LACTATION NOTE
This note was copied from a baby's chart.  P1 term baby 16hrs old. Mom reports breast feeding going well. Baby has had 2 wet and one stool.   Reviewed milk production, how to know baby is getting enough milk, feeding cues, expected output, nursing on demand or every 3hrs and encouraged to call for any assist.

## 2025-01-31 NOTE — LACTATION NOTE
P1,T mom bf in L&D for 10 minutes and recently tried. Baby is swaddled and on mom. He easily woke once unwrapped and latched well for a few minutes then gagged and spit up clear fluid. He also burped and has the hiccups. After this resolved he latched better but not completely consistent and bf on both breasts with longest feeding on the left. Moderate to strong tugging and wide gape present. Mom did not feel discomfort. She is exhausted and did need full assist holding and re latching baby while feeding. Showed dad ways he could assist her.       Reviewed bf education: ways to wake baby- STS, suck training, stimulation, HE colostrum, burping.   Attempt feeding on demand but at least every 3 hours and when baby is alert, feeding cues present.   Normal  behavior including sleepiness  Proper way to position and steps for an effective latch, break latch if pinching and check nipple shape after feeds.   Weight and output expectations.  Infant stomach size  Full milk supply day 3-5.  Nipple care. Lanolin given with instructions.  Review Postpartum handbook pages 35-45, [facility].  Call LC for any assistance. # on WB.   Has PBP.

## 2025-01-31 NOTE — PAYOR COMM NOTE
"Giovanna Alfonso (24 y.o. Female)       Date of Birth   2000    Social Security Number       Address   [address] [place] [address]    Home Phone   [phone]    MRN   [id number]       [facility]    Marital Status                               Admission Date   1/29/25    Admission Type   Elective    Admitting Provider   Alexa Nuñez MD    Attending Provider   Alexa Nuñez MD    Department, Room/Bed   [address], E347/1       Discharge Date       Discharge Disposition       Discharge Destination                                 Attending Provider: Alexa Nuñez MD    Allergies: No Known Allergies    Isolation: None   Infection: None   Code Status: CPR    Ht: 154.9 cm (61\")   Wt: 79.1 kg (174 lb 6.1 oz)    Admission Cmt: None   Principal Problem: Pregnancy [Z34.90]                   Active Insurance as of 1/29/2025       Primary Coverage       Payor Plan Insurance Group Employer/Plan Group    ANTHEM BLUE CROSS ANTHEM BLUE CROSS BLUE SHIELD PPO [id number]       Payor Plan Address Payor Plan Phone Number Payor Plan Fax Number Effective Dates    [address] [phone]  1/1/2022 - None Entered    [place] [address]         Subscriber Name Subscriber Birth Date Member ID       LUIS CARRERO [birth year]/24/1967 [id number]                     Emergency Contacts        (Rel.) Home Phone Work Phone Mobile Phone    WICHO ALFONSO (Significant Other) -- -- [phone]    LUIS CARRERO (Father) -- -- [phone]    KAT RENNY (Mother) -- -- [phone]              Insurance Information                  ANTHEM BLUE CROSS/ANTHEM BLUE CROSS BLUE SHIELD PPO Phone: [phone]    Subscriber: Luis Carrero Subscriber#: [id number]    Group#: [id number] Precert#: --    Authorization#: iol Effective Date: --             History & Physical        Alexa Nuñez MD at 01/29/25 0902          [facility]  Obstetric " History and Physical    Chief Complaint   Patient presents with    Scheduled Induction     IOL for polyhydraminos and LGA, +FM, denies LOF and bleeding        Subjective    Patient is a 24 y.o. female  currently at 39w0d, who presents for labor induction due to polyhydramnios and large for dates. She is feeling fetal movement. She denies vaginal bleeding or pelvic pain. She denies leaking fluid. She denies fever/chills.     Patient has been counseled regarding suspected large for dates. She is aware the risks of , hemorrhage, infection and shoulder dystocia are associated with large for dates. She has been counseled that shoulder dystocia can result in permanent injury or death to a . She understands risks and desires to proceed with IOL and declines .     Her prenatal care is complicated by   Patient Active Problem List   Diagnosis    Carrier of fragile X syndrome    UTI (urinary tract infection) in pregnancy in first trimester    Elevated glucose tolerance test    Maternal anemia in pregnancy, antepartum    Large for dates    Polyhydramnios in third trimester    Pregnancy      Her previous obstetric/gynecological history is not contributory.     The following portions of the patients history were reviewed and updated as appropriate: current medications, allergies, past medical history, past surgical history, past family history, past social history, and problem list .       Prenatal Information:  Prenatal Results       Initial Prenatal Labs       Test Value Reference Range Date Time    Hemoglobin  12.4 g/dL 11.1 - 15.9 24 1427    Hematocrit  36.5 % 34.0 - 46.6 24 142    Platelets  210 x10E3/uL 150 - 450 24 142    Rubella IgG  6.93 index Immune >0.99 24 142    Hepatitis B SAg  Negative  Negative 24 142    Hepatitis C Ab  Non Reactive  Non Reactive 24 142    RPR  Non Reactive  Non Reactive 24 142    T. Pallidum Ab   Non-Reactive   Non-Reactive 01/29/25 0524       Non Reactive  Non Reactive 11/08/24 1244    ABO  AB   01/29/25 0524    Rh  Positive   01/29/25 0524    Antibody Screen  Negative  Negative 08/16/24 1335    HIV  Non Reactive  Non Reactive 07/12/24 1427    Urine Culture  Final report   01/03/25 1641       Final report   08/16/24 1305       Final report   07/20/24 1549       Final report   07/12/24 1415       Final report   06/19/24 1606       Final report   05/30/24 1636       Final report   05/20/24     Gonorrhea  Negative  Negative 08/16/24 1327    Chlamydia  Negative  Negative 08/16/24 1327    TSH        HgB A1c   5.4 % 4.8 - 5.6 07/12/24 1427    Varicella IgG  1,409 index Immune >165 07/12/24 1427    Hemoglobinopathy Fractionation        Hemoglobinopathy (genetic testing) ^ negative   07/12/24     Cystic fibrosis   Negative   07/12/24 1349    Spinal muscular atrophy  Negative   07/12/24 1349    Fragile X                  Fetal testing        Test Value Reference Range Date Time    NIPT ^ low risk   07/12/24     MSAFP  *Screen Negative*   08/16/24 1335    AFP-4                  2nd and 3rd Trimester       Test Value Reference Range Date Time    Hemoglobin (repeated)  12.8 g/dL 12.0 - 15.9 01/29/25 0524       11.6 g/dL 12.0 - 15.9 12/27/24 1632       12.2 g/dL 11.1 - 15.9 11/08/24 1244    Hematocrit (repeated)  38.9 % 34.0 - 46.6 01/29/25 0524       34.4 % 34.0 - 46.6 12/27/24 1632       36.7 % 34.0 - 46.6 11/08/24 1244    Platelets   162 10*3/mm3 140 - 450 01/29/25 0524       178 10*3/mm3 140 - 450 12/27/24 1632       200 x10E3/uL 150 - 450 11/08/24 1244       210 x10E3/uL 150 - 450 07/12/24 1427    1 hour GTT   155 mg/dL 70 - 139 11/08/24 1244    Antibody Screen (repeated)  Negative   01/29/25 0524    3rd TM syphilis scrn (repeated)  RPR         3rd TM syphilis scrn (repeated) TP-Ab  Non-Reactive  Non-Reactive 01/29/25 0524       Non Reactive  Non Reactive 11/08/24 1244    3rd TM syphilis screen TB-Ab (FTA)  Non-Reactive   Non-Reactive 01/29/25 0524       Non Reactive  Non Reactive 11/08/24 1244    Syphilis cascade test TP-Ab (EIA)        Syphilis cascade TPPA        GTT Fasting  87 mg/dL 70 - 94 11/15/24 0811    GTT 1 Hr  169 mg/dL 70 - 179 11/15/24 0811    GTT 2 Hr  129 mg/dL 70 - 154 11/15/24 0811    GTT 3 Hr  95 mg/dL 70 - 139 11/15/24 0811    Group B Strep  Negative  Negative 01/03/25 1635              Other testing        Test Value Reference Range Date Time    Parvo IgG         CMV IgG                   Drug Screening       Test Value Reference Range Date Time    Amphetamine Screen        Barbiturate Screen        Benzodiazepine Screen        Methadone Screen        Phencyclidine Screen        Opiates Screen        THC Screen        Cocaine Screen        Propoxyphene Screen        Buprenorphine Screen        Methamphetamine Screen        Oxycodone Screen        Tricyclic Antidepressants Screen                  Legend    ^: Historical                          External Prenatal Results       Pregnancy Outside Results - Transcribed From Office Records - See Scanned Records For Details       Test Value Date Time    ABO  AB  01/29/25 0524    Rh  Positive  01/29/25 0524    Antibody Screen  Negative  01/29/25 0524       Negative  08/16/24 1335    Varicella IgG  1,409 index 07/12/24 1427    Rubella  6.93 index 07/12/24 1427    Hgb  12.8 g/dL 01/29/25 0524       11.6 g/dL 12/27/24 1632       12.2 g/dL 11/08/24 1244       12.4 g/dL 07/12/24 1427    Hct  38.9 % 01/29/25 0524       34.4 % 12/27/24 1632       36.7 % 11/08/24 1244       36.5 % 07/12/24 1427    HgB A1c   5.4 % 07/12/24 1427    1h GTT  155 mg/dL 11/08/24 1244    3h GTT Fasting  87 mg/dL 11/15/24 0811    3h GTT 1 hour  169 mg/dL 11/15/24 0811    3h GTT 2 hour  129 mg/dL 11/15/24 0811    3h GTT 3 hour   95 mg/dL 11/15/24 0811    Gonorrhea (discrete)  Negative  08/16/24 1327    Chlamydia (discrete)  Negative  08/16/24 1327    RPR  Non Reactive  07/12/24 1427    Syphils cascade:  TP-Ab (FTA)  Non-Reactive  25 0524       Non Reactive  24 1244    TP-Ab  Non-Reactive  25 0524       Non Reactive  24 1244    TP-Ab (EIA)       TPPA       HBsAg  Negative  24 1427    Herpes Simplex Virus PCR       Herpes Simplex VIrus Culture       HIV  Non Reactive  24 1427    Hep C RNA Quant PCR       Hep C Antibody  Non Reactive  24 1427    AFP  40.0 ng/mL 24 1335    NIPT ^ low risk  24     Cystic Fibrosis (Leeanna)  Negative  24 1349    Cystic Fibroisis  ^ negative  24     Spinal Muscular atrophy  Negative  24 1349    Fragile X       Group B Strep  Negative  25 1635    GBS Susceptibility to Clindamycin       GBS Susceptibility to Erythromycin       Fetal Fibronectin       Genetic Testing, Maternal Blood                 Drug Screening       Test Value Date Time    Urine Drug Screen       Amphetamine Screen       Barbiturate Screen       Benzodiazepine Screen       Methadone Screen       Phencyclidine Screen       Opiates Screen       THC Screen       Cocaine Screen       Propoxyphene Screen       Buprenorphine Screen       Methamphetamine Screen       Oxycodone Screen       Tricyclic Antidepressants Screen                 Legend    ^: Historical                             Past OB History:     OB History    Para Term  AB Living   1 0 0 0 0 0   SAB IAB Ectopic Molar Multiple Live Births   0 0 0 0 0 0      # Outcome Date GA Lbr Santana/2nd Weight Sex Type Anes PTL Lv   1 Current                Past Medical History: Past Medical History:   Diagnosis Date    No pertinent past medical history       Past Surgical History Past Surgical History:   Procedure Laterality Date    TONSILLECTOMY      WISDOM TOOTH EXTRACTION        Family History: Family History   Problem Relation Age of Onset    Diabetes Father     Hypertension Father     Stroke Mother     Breast cancer Neg Hx     Ovarian cancer Neg Hx     Uterine cancer Neg Hx     Colon  cancer Neg Hx       Social History:  reports that she has never smoked. She has never been exposed to tobacco smoke. She has never used smokeless tobacco.   reports no history of alcohol use.   reports no history of drug use.        General ROS: Pertinent items are noted in HPI    Objective      Vital Signs Range for the last 24 hours  Temperature: Temp:  [98.7 °F (37.1 °C)] 98.7 °F (37.1 °C)   Temp Source: Temp src: Oral   BP: BP: (124)/(76) 124/76   Pulse: Heart Rate:  [103] 103   Respirations: Resp:  [20] 20   SPO2: SpO2:  [100 %] 100 %   O2 Amount (l/min):     O2 Devices Device (Oxygen Therapy): room air   Weight: Weight:  [79.1 kg (174 lb 6.1 oz)-79.1 kg (174 lb 6.4 oz)] 79.1 kg (174 lb 6.1 oz)     Physical Examination: General appearance - alert, well appearing, and in no distress  Mental status - alert, oriented to person, place, and time  Chest - clear to auscultation, no wheezes, rales or rhonchi, symmetric air entry  Heart - normal rate and regular rhythm  Abdomen - soft, nontender  Neurological - alert, oriented, normal speech, no focal findings or movement disorder noted  Extremities - bilateral lower ext are without cords or tenderness; trace edema noted    Presentation: Vtx confirmed with bedside u/s   Cervix: Exam by: Method: sterile vaginal exam performed   Dilation: Cervical Dilation (cm): 0-1   Effacement: Cervical Effacement: 50   Station:         Fetal Heart Rate Assessment   Method: Fetal HR Assessment Method: external   Beats/min: Fetal HR (beats/min): 130   Baseline: Fetal HR Baseline: normal range   Variability: Fetal HR Variability: moderate (amplitude range 6 to 25 bpm)   Accels: Fetal HR Accelerations: greater than/equal to 15 bpm   Decels: Fetal HR Decelerations: absent   Tracing Category:       Uterine Assessment   Method: Method: external tocotransducer   Frequency (min): Contraction Frequency (Minutes): x3   Ctx Count in 10 min:     Duration:     Intensity: Contraction Intensity: no  contractions   Intensity by IUPC:     Resting Tone: Uterine Resting Tone: soft by palpation   Resting Tone by IUPC:     [place] Units:           Assessment & Plan      Pregnancy    Large for dates    Polyhydramnios in third trimester        Assessment:  1.  Intrauterine pregnancy at 39w0d gestation with reactive fetal status.    2.  induction of labor  for polyhydramnios/large for dates  with unfavorable cervix  3.  Obstetrical history significant for is non-contributory.  4.  GBS status:   Strep Gp B Culture   Date Value Ref Range Status   2025 Negative Negative Final     Comment:     Centers for Disease Control and Prevention (CDC) and American Congress  of Obstetricians and Gynecologists (ACOG) guidelines for prevention of   group B streptococcal (GBS) disease specify co-collection of  a vaginal and rectal swab specimen to maximize sensitivity of GBS  detection. Per the CDC and ACOG, swabbing both the lower vagina and  rectum substantially increases the yield of detection compared with  sampling the vagina alone.  Penicillin G, ampicillin, or cefazolin are indicated for intrapartum  prophylaxis of  GBS colonization. Reflex susceptibility  testing should be performed prior to use of clindamycin only on GBS  isolates from penicillin-allergic women who are considered a high risk  for anaphylaxis. Treatment with vancomycin without additional testing  is warranted if resistance to clindamycin is noted.         Plan:  1. fetal and uterine monitoring  continuously and cervical ripening with  Cervidil  2. Plan of care has been reviewed with patient and spouse  3.  Risks, benefits of treatment plan have been discussed.  4.  All questions have been answered.      Alexa Nuñez MD  2025  09:07 EST      Electronically signed by Alexa Nuñez MD at 25 0914       Facility-Administered Medications as of 2025   Medication Dose Route Frequency Provider Last Rate Last Admin     [COMPLETED] acetaminophen (TYLENOL) tablet 1,000 mg  1,000 mg Oral Once Lauri Barney MD   1,000 mg at 25 1710    acetaminophen (TYLENOL) tablet 1,000 mg  1,000 mg Oral Q6H Lauri Barney MD   1,000 mg at 25 0610    Followed by    acetaminophen (TYLENOL) tablet 650 mg  650 mg Oral Q6H Lauri Barney MD        all purpose nipple ointment   Topical 4x Daily PRN Lauri Barney MD        [COMPLETED] azithromycin (ZITHROMAX) 500 mg in sodium chloride 0.9 % 250 mL IVPB-VTB  500 mg Intravenous Once Lauri Barney MD   500 mg at 25 172    [COMPLETED] ceFAZolin 2000 mg IVPB in 100 mL NS (MBP)  2 g Intravenous Once Lauri Barney MD   2 g at 25 181    [COMPLETED] dinoprostone (CERVIDIL) vaginal insert 10 mg  10 mg Vaginal Once Alexa Nuñez MD   10 mg at 25 0604    [COMPLETED] dinoprostone (CERVIDIL) vaginal insert 10 mg  10 mg Vaginal Once Alexa Nuñez MD   10 mg at 25    [] erythromycin (ROMYCIN) 5 MG/GM ophthalmic ointment  - ADS Override Pull             [COMPLETED] famotidine (PEPCID) injection 20 mg  20 mg Intravenous Once PRN Onofre Templeton MD   20 mg at 25 1724    fentaNYL 2mcg/mL and ropivacaine 0.2% in NS epidural 100mL  10 mL/hr Epidural Continuous Onofre Templeton MD   Stopped at 25 1807    Hydrocortisone (Perianal) (ANUSOL-HC) 2.5 % rectal cream   Rectal TID PRN Lauri Barney MD        hydrOXYzine (ATARAX) tablet 50 mg  50 mg Oral Q6H PRN Lauri Barney MD        ketorolac (TORADOL) injection 15 mg  15 mg Intravenous Q6H Lauri Barney MD   15 mg at 25 0902    Followed by    [START ON 2025] ibuprofen (ADVIL,MOTRIN) tablet 600 mg  600 mg Oral Q6H Lauri Barney MD        [COMPLETED] ketorolac (TORADOL) injection 30 mg  30 mg Intravenous Once Lauri Barney MD   30 mg at 25    [] lactated ringers bolus 1,000 mL  1,000 mL Intravenous Once PRN Alexa Nuñez MD        [COMPLETED]  lactated ringers bolus 500 mL  500 mL Intravenous Once Rock Evans MD   Stopped at 25    lactated ringers infusion  125 mL/hr Intravenous Continuous Alexa Nuñez MD   Stopped at 25    magnesium hydroxide (MILK OF MAGNESIA) suspension 10 mL  10 mL Oral Daily PRN aLuri Barney MD        [COMPLETED] ondansetron (ZOFRAN) injection 4 mg  4 mg Intravenous Once PRN Onofre Templeton MD   4 mg at 25 1724    ondansetron (ZOFRAN) injection 4 mg  4 mg Intravenous Q6H PRN Lauri Barney MD        ondansetron ODT (ZOFRAN-ODT) disintegrating tablet 4 mg  4 mg Oral Q8H PRN Lauri Barney MD        oxyCODONE (ROXICODONE) immediate release tablet 5 mg  5 mg Oral Q4H PRN Lauri Barney MD        Or    oxyCODONE (ROXICODONE) immediate release tablet 10 mg  10 mg Oral Q4H PRN Lauri Barney MD        [COMPLETED] oxytocin (PITOCIN) 30 units in 0.9% sodium chloride 500 mL (premix)  999 mL/hr Intravenous Once Alexa Nuñez  mL/hr at 25 250 mL/hr at 25    Followed by    [] oxytocin (PITOCIN) 30 units in 0.9% sodium chloride 500 mL (premix)  250 mL/hr Intravenous Continuous Alexa Nuñez MD        oxytocin (PITOCIN) 30 units in 0.9% sodium chloride 500 mL (premix)  2-20 hilda-units/min Intravenous Titrated Lauri Barney MD   Stopped at 25    [] oxytocin (PITOCIN) 30 units in 0.9% sodium chloride 500 mL (premix)  250 mL/hr Intravenous Continuous Lauri Barney MD   Stopped at 25    [COMPLETED] oxytocin (PITOCIN) 30 units in 0.9% sodium chloride 500 mL (premix)  125 mL/hr Intravenous Once PRN Lauri Barney  mL/hr at 25 125 mL/hr at 25    [] phytonadione (VITAMIN K) 1 MG/0.5ML injection  - ADS Override Pull             simethicone (MYLICON) chewable tablet 80 mg  80 mg Oral 4x Daily PRN Lauri Barney MD        tranexamic acid 1000 mg in 100 mL 0.7% NaCl infusion (premix)   1,000 mg Intravenous Once Alexa Da Silva MD         Lab Results (last 72 hours)       Procedure Component Value Units Date/Time    CBC & Differential [[id number]]  (Abnormal) Collected: 01/31/25 1037    Specimen: Blood Updated: 01/31/25 1104    Narrative:      The following orders were created for panel order CBC & Differential.  Procedure                               Abnormality         Status                     ---------                               -----------         ------                     CBC Auto Differential[[id number]]        Abnormal            Final result                 Please view results for these tests on the individual orders.    CBC Auto Differential [[id number]]  (Abnormal) Collected: 01/31/25 1037    Specimen: Blood Updated: 01/31/25 1104     WBC 9.48 10*3/mm3      RBC 3.09 10*6/mm3      Hemoglobin 9.1 g/dL      Hematocrit 28.5 %      MCV 92.2 fL      MCH 29.4 pg      MCHC 31.9 g/dL      RDW 12.7 %      RDW-SD 42.9 fl      MPV 11.7 fL      Platelets 147 10*3/mm3      Neutrophil % 71.2 %      Lymphocyte % 16.6 %      Monocyte % 11.1 %      Eosinophil % 0.5 %      Basophil % 0.1 %      Immature Grans % 0.5 %      Neutrophils, Absolute 6.75 10*3/mm3      Lymphocytes, Absolute 1.57 10*3/mm3      Monocytes, Absolute 1.05 10*3/mm3      Eosinophils, Absolute 0.05 10*3/mm3      Basophils, Absolute 0.01 10*3/mm3      Immature Grans, Absolute 0.05 10*3/mm3     Blood Gas, Venous, Cord [[id number]] Collected: 01/30/25 1853    Specimen: Cord Blood Venous from Umbilical Cord Updated: 01/30/25 1855     Site --     Comment: N/A        pH, Cord Venous 7.355 pH Units      Comment: Serial Number: [id number]:  [id number]        pCO2, Cord Venous 39.0 mm Hg      pO2, Cord Venous 30.3 mm Hg      HCO3, Cord Venous 21.8 mmol/L      Base Excess, Cord Venous -3.4 mmol/L      O2 Sat, Cord Venous --     Comment: Direct O2 saturation result not reported at this site.        CO2 Content 23.0 mmol/L      Barometric  Pressure for Blood Gas 748.4000 mmHg      Modality Room Air    Treponema pallidum AB w/Reflex RPR [[id number]]  (Normal) Collected: 01/29/25 0524    Specimen: Blood Updated: 01/29/25 0608     Treponemal AB Total Non-Reactive    Narrative:      Reactive results will reflex RPR testing.    Comprehensive Metabolic Panel [[id number]]  (Abnormal) Collected: 01/29/25 0524    Specimen: Blood Updated: 01/29/25 0603     Glucose 90 mg/dL      BUN 7 mg/dL      Creatinine 0.68 mg/dL      Sodium 137 mmol/L      Potassium 4.1 mmol/L      Comment: Slight hemolysis detected by analyzer. Result may be falsely elevated.        Chloride 103 mmol/L      CO2 19.5 mmol/L      Calcium 8.9 mg/dL      Total Protein 6.4 g/dL      Albumin 3.5 g/dL      ALT (SGPT) 10 U/L      AST (SGOT) 15 U/L      Alkaline Phosphatase 157 U/L      Total Bilirubin <0.2 mg/dL      Globulin 2.9 gm/dL      A/G Ratio 1.2 g/dL      BUN/Creatinine Ratio 10.3     Anion Gap 14.5 mmol/L      eGFR 126.5 mL/min/1.73     Narrative:      GFR Categories in Chronic Kidney Disease (CKD)      GFR Category          GFR (mL/min/1.73)    Interpretation  G1                     90 or greater         Normal or high (1)  G2                      60-89                Mild decrease (1)  G3a                   45-59                Mild to moderate decrease  G3b                   30-44                Moderate to severe decrease  G4                    15-29                Severe decrease  G5                    14 or less           Kidney failure          (1)In the absence of evidence of kidney disease, neither GFR category G1 or G2 fulfill the criteria for CKD.    eGFR calculation 2021 CKD-EPI creatinine equation, which does not include race as a factor    CBC & Differential [[id number]]  (Abnormal) Collected: 01/29/25 0524    Specimen: Blood Updated: 01/29/25 0548    Narrative:      The following orders were created for panel order CBC & Differential.  Procedure                                "Abnormality         Status                     ---------                               -----------         ------                     CBC Auto Differential[[id number]]        Abnormal            Final result                 Please view results for these tests on the individual orders.    CBC Auto Differential [[id number]]  (Abnormal) Collected: 01/29/25 0524    Specimen: Blood Updated: 01/29/25 0548     WBC 11.73 10*3/mm3      RBC 4.29 10*6/mm3      Hemoglobin 12.8 g/dL      Hematocrit 38.9 %      MCV 90.7 fL      MCH 29.8 pg      MCHC 32.9 g/dL      RDW 12.9 %      RDW-SD 41.9 fl      MPV 11.7 fL      Platelets 162 10*3/mm3      Neutrophil % 69.8 %      Lymphocyte % 21.2 %      Monocyte % 7.8 %      Eosinophil % 0.3 %      Basophil % 0.2 %      Immature Grans % 0.7 %      Neutrophils, Absolute 8.19 10*3/mm3      Lymphocytes, Absolute 2.49 10*3/mm3      Monocytes, Absolute 0.91 10*3/mm3      Eosinophils, Absolute 0.04 10*3/mm3      Basophils, Absolute 0.02 10*3/mm3      Immature Grans, Absolute 0.08 10*3/mm3      nRBC 0.0 /100 WBC           Imaging Results (Last 72 Hours)       ** No results found for the last 72 hours. **          ECG/EMG Results (last 72 hours)       ** No results found for the last 72 hours. **          Orders (last 72 hrs)        Start     Ordered    02/01/25 0300  ibuprofen (ADVIL,MOTRIN) tablet 600 mg  Every 6 Hours        Placed in \"Followed by\" Linked Group    01/30/25 2041 01/31/25 2300  acetaminophen (TYLENOL) tablet 650 mg  Every 6 Hours        Placed in \"Followed by\" Linked Group    01/30/25 2115 01/31/25 1103  Manual Differential  Once,   Status:  Canceled         01/31/25 1102    01/31/25 0800  Ambulate Patient  2 Times Daily      Comments: After anesthesia wears off.    01/30/25 2115 01/31/25 0600  Discontinue Indwelling Urinary Catheter in AM  Once         01/30/25 2115 01/31/25 0600  CBC & Differential  Morning Draw         01/30/25 2115 01/31/25 0600  CBC Auto " Differential  PROCEDURE ONCE         01/30/25 2203 01/31/25 0000  Turn Cough Deep Breathe  Every 4 Hours       01/30/25 2115 01/31/25 0000  Discontinue IV  Once         01/30/25 2115 01/31/25 0000  Discontinue PCA  Once         01/30/25 2115 01/31/25 0000  Remove Abdominal Dressing  Once         01/30/25 2115 01/30/25 2200  Incentive spirometry RT  Every Hour       01/30/25 2115 01/30/25 2130  ketorolac (TORADOL) injection 30 mg  Once         01/30/25 2044 01/30/25 2117  Notify Provider (Specified)  Until Discontinued         01/30/25 2116 01/30/25 2117  Vital Signs Per Hospital Policy  Per Hospital Policy/Protocol         01/30/25 2116 01/30/25 2117  Fundal & Lochia Check  Per Order Details        Comments: Every 15 Minutes x4, Then Every 30 Minutes x2, Then Every Shift    01/30/25 2116 01/30/25 2117  Diet: Regular/House; Fluid Consistency: Thin (IDDSI 0)  Diet Effective Now         01/30/25 2116 01/30/25 2117  Advance Diet As Tolerated -  Until Discontinued         01/30/25 2116 01/30/25 2117  Transfer to postpartum when discharge criteria met.  Until Discontinued         01/30/25 2116 01/30/25 2116  Fundal & Lochia Check  Every Shift       01/30/25 2116 01/30/25 2116  Transfer Patient  Once         01/30/25 2115 01/30/25 2116  Code Status and Medical Interventions: CPR (Attempt to Resuscitate); Full  Continuous         01/30/25 2115 01/30/25 2116  Vital Signs Per hospital policy  Per Hospital Policy/Protocol         01/30/25 2115 01/30/25 2116  Notify Physician  Until Discontinued         01/30/25 2115 01/30/25 2116  Patient May Shower  Once        Comments: After anesthesia wears off and with assistance    01/30/25 2115 01/30/25 2116  Diet: Regular/House; Fluid Consistency: Thin (IDDSI 0)  Diet Effective Now,   Status:  Canceled         01/30/25 2115 01/30/25 2116  Advance Diet As Tolerated -Regular  Until Discontinued         01/30/25 2115 01/30/25  "2116  I/O  Every Shift       01/30/25 2115 01/30/25 2116  Fundal and Lochia Check  Per Hospital Policy/Protocol        Comments: Q 30 min x 2, Q 1 hr x 4, Q 4 hrs x 24 hrs, then Q shift.    01/30/25 2115 01/30/25 2116  Weigh Patient  Once         01/30/25 2115 01/30/25 2116  Continue Indwelling Urinary Catheter Already in Place  Once         01/30/25 2115 01/30/25 2116  Notify Provider if Bladder Distention Continues  Until Discontinued         01/30/25 2115 01/30/25 2116  Urinary Catheter Care  Every Shift,   Status:  Canceled       01/30/25 2115 01/30/25 2116  KPad  Per Order Details        Comments: PRN Pain    01/30/25 2115 01/30/25 2116  Encourage early intake of PO fluids  Continuous         01/30/25 2115 01/30/25 2116  Ambulate Patient 3-5 times per day (with or without Amaral)  Every Shift       01/30/25 2115 01/30/25 2116  Apply Abdominal Binding Until Discontinued  Until Discontinued         01/30/25 2115 01/30/25 2116  \"If patient tolerates food and liquids after completion of second bag of Pitocin, saline lock IV and discontinue IV fluid infusions.  Once         01/30/25 2115 01/30/25 2116  Breast pump to bed  Once         01/30/25 2115 01/30/25 2116  If indicated -- Please administer RH Immunoglobulin based on results of cord blood evaluation and fetal screen lab tests, pharmacy to dispense  Per Order Details        Comments: See Process Instructions For Reference Range Details.    01/30/25 2115 01/30/25 2116  Place Sequential Compression Device  Once         01/30/25 2115 01/30/25 2116  Maintain Sequential Compression Device  Continuous         01/30/25 2115 01/30/25 2116  VTE Prophylaxis Not Indicated: Low Risk; No Risk Factors (0)  Once         01/30/25 2115 01/30/25 2115  magnesium hydroxide (MILK OF MAGNESIA) suspension 10 mL  Daily PRN         01/30/25 2115 01/30/25 2115  simethicone (MYLICON) chewable tablet 80 mg  4 Times Daily PRN         " "01/30/25 2115 01/30/25 2115  ondansetron ODT (ZOFRAN-ODT) disintegrating tablet 4 mg  Every 8 Hours PRN         01/30/25 2115 01/30/25 2115  ondansetron (ZOFRAN) injection 4 mg  Every 6 Hours PRN         01/30/25 2115 01/30/25 2115  all purpose nipple ointment  4 Times Daily PRN         01/30/25 2115 01/30/25 2115  hydrOXYzine (ATARAX) tablet 50 mg  Every 6 Hours PRN         01/30/25 2115 01/30/25 2115  Hydrocortisone (Perianal) (ANUSOL-HC) 2.5 % rectal cream  3 Times Daily PRN         01/30/25 2115 01/30/25 2115  acetaminophen (TYLENOL) tablet 1,000 mg  Every 6 Hours        Placed in \"Followed by\" Linked Group    01/30/25 2115 01/30/25 2115  oxyCODONE (ROXICODONE) immediate release tablet 5 mg  Every 4 Hours PRN        Placed in \"Or\" Linked Group    01/30/25 2115 01/30/25 2115  oxyCODONE (ROXICODONE) immediate release tablet 10 mg  Every 4 Hours PRN        Placed in \"Or\" Linked Group    01/30/25 2115 01/30/25 2041  ketorolac (TORADOL) injection 15 mg  Every 6 Hours        Placed in \"Followed by\" Linked Group    01/30/25 2041 01/30/25 2030  lactated ringers bolus 500 mL  Once         01/30/25 1938    01/30/25 1913  oxytocin (PITOCIN) 30 units in 0.9% sodium chloride 500 mL (premix)  Once As Needed         01/30/25 1913    01/30/25 1830  oxytocin (PITOCIN) 30 units in 0.9% sodium chloride 500 mL (premix)  Continuous        Placed in \"Followed by\" Linked Group    01/30/25 1728    01/30/25 1830  oxytocin (PITOCIN) 30 units in 0.9% sodium chloride 500 mL (premix)  Continuous        Placed in \"Followed by\" Linked Group    01/30/25 1728    01/30/25 1828  Specimen To Pathology  Once         01/30/25 1827    01/30/25 1824  Blood Gas, Venous, Cord  Once         01/30/25 1824 01/30/25 1815  oxytocin (PITOCIN) 30 units in 0.9% sodium chloride 500 mL (premix)  Once        Placed in \"Followed by\" Linked Group    01/30/25 1728 01/30/25 1815  ketorolac (TORADOL) injection 30 mg  Once,   Status:  " "Discontinued         01/30/25 1728    01/30/25 1755  erythromycin (ROMYCIN) 5 MG/GM ophthalmic ointment  - ADS Override Pull        Note to Pharmacy: Created by cabinet override    01/30/25 1755    01/30/25 1755  phytonadione (VITAMIN K) 1 MG/0.5ML injection  - ADS Override Pull        Note to Pharmacy: Created by cabinet override    01/30/25 1755    01/30/25 1745  acetaminophen (TYLENOL) tablet 1,000 mg  Once         01/30/25 1656 01/30/25 1745  oxytocin (PITOCIN) 30 units in 0.9% sodium chloride 500 mL (premix)  Once,   Status:  Discontinued        Placed in \"Followed by\" Linked Group    01/30/25 1728 01/30/25 1745  ceFAZolin 2000 mg IVPB in 100 mL NS (MBP)  Once         01/30/25 1656 01/30/25 1745  azithromycin (ZITHROMAX) 500 mg in sodium chloride 0.9 % 250 mL IVPB-VTB  Once         01/30/25 1656 01/30/25 1728  methylergonovine (METHERGINE) injection 200 mcg  Once As Needed,   Status:  Discontinued         01/30/25 1728    01/30/25 1728  carboprost (HEMABATE) injection 250 mcg  Every 15 Minutes PRN,   Status:  Discontinued         01/30/25 1728    01/30/25 1728  miSOPROStol (CYTOTEC) tablet 800 mcg  Once As Needed,   Status:  Discontinued         01/30/25 1728    01/30/25 1728  tranexamic acid 1000 mg in 100 mL 0.7% NaCl infusion (premix)  Once As Needed         01/30/25 1728    01/30/25 1656  Insert Indwelling Urinary Catheter  Once,   Status:  Canceled        Comments: Follow Protocol As Outlined in Process Instructions (Open Order Report to View Full Instructions)   Placed in \"And\" Linked Group    01/30/25 1656 01/30/25 1656  Assess Need for Indwelling Urinary Catheter - Follow Removal Protocol  Continuous,   Status:  Canceled        Comments: Follow Protocol As Outlined in Process Instructions (Open Order Report to View Full Instructions)   Placed in \"And\" Linked Group    01/30/25 1656    01/30/25 1656  Urinary Catheter Care  Every Shift,   Status:  Canceled      Placed in \"And\" Linked Group " "   01/30/25 1656    01/30/25 1656  Abdominal Prep With Clippers  Once,   Status:  Canceled         01/30/25 1656    01/30/25 1656  Chlorhexadine Skin Prep Unless Otherwise Indicated  Once,   Status:  Canceled         01/30/25 1656    01/30/25 1656  SCD (Sequential Compression Devices)  Once,   Status:  Canceled         01/30/25 1656    01/30/25 1656  POC Glucose Once  Once,   Status:  Canceled        Comments: Complete no more than 45 minutes prior to patient eating      01/30/25 1656    01/30/25 1656  Document Gatorade Consumption Prior to Admission (Yes or No)  Once,   Status:  Canceled         01/30/25 1656    01/30/25 1656  Betadine/CHG \"Vaginal Prep\"  Once,   Status:  Canceled         01/30/25 1656    01/30/25 1656  NPO Diet NPO Type: Strict NPO  Diet Effective Now,   Status:  Canceled         01/30/25 1656    01/30/25 1656  Inpatient Anesthesiology Consult  Once,   Status:  Canceled        Specialty:  Anesthesiology  Provider:  (Not yet assigned)    01/30/25 1656    01/30/25 1655  famotidine (PEPCID) injection 20 mg  Once As Needed,   Status:  Discontinued         01/30/25 1656    01/30/25 1045  oxytocin (PITOCIN) 30 units in 0.9% sodium chloride 500 mL (premix)  Titrated         01/30/25 0949    01/29/25 1930  dinoprostone (CERVIDIL) vaginal insert 10 mg  Once         01/29/25 1843    01/29/25 1844  Place Sequential Compression Device  Once         01/29/25 1843    01/29/25 1844  Maintain Sequential Compression Device  Continuous         01/29/25 1843    01/29/25 0900  sodium chloride 0.9 % flush 10 mL  Every 12 Hours Scheduled,   Status:  Discontinued         01/29/25 0434    01/29/25 0530  lactated ringers infusion  Continuous         01/29/25 0434    01/29/25 0530  dinoprostone (CERVIDIL) vaginal insert 10 mg  Once         01/29/25 0434    01/29/25 0530  fentaNYL 2mcg/mL and ropivacaine 0.2% in NS epidural 100mL  Continuous         01/29/25 0441    01/29/25 0442  Vital Signs Per Anesthesia Guidelines  Per " "Order Details,   Status:  Canceled        Comments: Every 2 Minutes x5, Every 5 Minutes x4, Then If Stable Every 15 Minutes    01/29/25 0441 01/29/25 0442  Start IV with #16 or #18 gauge angiocath.  Once         01/29/25 0441    01/29/25 0442  Fetal Heart Rate Monitor  Once         01/29/25 0441 01/29/25 0442  Nurse or anesthesiologist to remain with patient for 15 minutes following dosing.  Until Discontinued,   Status:  Canceled         01/29/25 0441    01/29/25 0442  Facilitate maternal postion on side and maintain uterine displacement.  Until Discontinued,   Status:  Canceled         01/29/25 0441    01/29/25 0442  Consult anesthesia services prior to changing epidural infusion/rate.  Until Discontinued,   Status:  Canceled         01/29/25 0441 01/29/25 0442  Nurse may remove epidural catheter after delivery.  Until Discontinued,   Status:  Canceled         01/29/25 0441    01/29/25 0442  Insert Indwelling Urinary Catheter  Once,   Status:  Canceled        Placed in \"And\" Linked Group    01/29/25 0441 01/29/25 0442  Assess Need for Indwelling Urinary Catheter - Follow Removal Protocol  Continuous,   Status:  Canceled        Comments: Indwelling Urinary Catheter Removal Criteria  Discontinue Indwelling Urinary Catheter Unless One of the Following is Present:  Urinary Retention or Obstruction  Chronic Urinary Catheter Use  End of Life  Critical Illness with Strict I/O   Tract or Abdominal Surgery  Stage 3/4 Sacral / Perineal Wound  Required Activity Restriction: Trauma  Required Activity Restriction: Spine Surgery  If Patient is Being Followed by Urology Contact Them PRIOR to Removal  Do Not Remove Indwelling Urinary Catheter Order is Present with a CLINICAL REASON to Maintain the Catheter. Provider is Required to Include a Clinical Reason to Maintain a Urinary Catheter    Patient Admitted With Indwelling Urinary Catheter (Not Placed at [facility])  Assess for Continued Need & Document Medical " "Necessity  If Infection is Suspected, Contact the Provider       Placed in \"And\" Linked Group    01/29/25 0441    01/29/25 0442  Urinary Catheter Care  Every Shift,   Status:  Canceled      Placed in \"And\" Linked Group    01/29/25 0441    01/29/25 0442  Notify physician for the following conditions:  Until Discontinued,   Status:  Canceled         01/29/25 0441 01/29/25 0441  ePHEDrine injection 5 mg  Every 15 Minutes PRN,   Status:  Discontinued         01/29/25 0441 01/29/25 0441  ondansetron (ZOFRAN) injection 4 mg  Once As Needed         01/29/25 0441 01/29/25 0441  famotidine (PEPCID) injection 20 mg  Once As Needed         01/29/25 0441 01/29/25 0441  diphenhydrAMINE (BENADRYL) injection 12.5 mg  Every 8 Hours PRN,   Status:  Discontinued         01/29/25 0441 01/29/25 0433  Treponema pallidum AB w/Reflex RPR  STAT         01/29/25 0434    01/29/25 0433  CBC & Differential  STAT         01/29/25 0434    01/29/25 0433  Comprehensive Metabolic Panel  STAT         01/29/25 0434    01/29/25 0433  Type & Screen  STAT         01/29/25 0434    01/29/25 0433  Diet: Liquid; Clear Liquid; Fluid Consistency: Thin (IDDSI 0)  Diet Effective Now,   Status:  Canceled         01/29/25 0434 01/29/25 0433  CBC Auto Differential  PROCEDURE ONCE         01/29/25 0434 01/29/25 0432  VTE Prophylaxis Not Indicated: Low Risk; Obesity - BMI >30 (1)  Once         01/29/25 0434 01/29/25 0431  Admit To Obstetrics Inpatient  Once         01/29/25 0434 01/29/25 0431  Code Status and Medical Interventions: CPR (Attempt to Resuscitate); Full Support  Continuous,   Status:  Canceled         01/29/25 0434 01/29/25 0431  Obtain Informed Consent  Once         01/29/25 0434 01/29/25 0431  Vital Signs Per Hospital Policy  Per Hospital Policy/Protocol,   Status:  Canceled         01/29/25 0434 01/29/25 0431  Mini-Prep Prior to Delivery  Once         01/29/25 0434    01/29/25 0431  Continuous Fetal Monitoring With " NST on Admission and Prior to Initiation of Oxytocin.  Per Order Details,   Status:  Canceled        Comments: Continuous Fetal Monitoring With NST on Admission & Prior to Initiation of Oxytocin.    25  External Uterine Contraction Monitoring  Per Hospital Policy/Protocol,   Status:  Canceled         25  Notify Provider (Specified)  Until Discontinued,   Status:  Canceled         25  Notify Provider of Tachysystole (Per Hospital Algorithm)  Until Discontinued,   Status:  Canceled         25  Notify Provider if Membranes Ruptured, Bleeding Greater Than 1 Pad Per Hour, Fetal Heart Tone Abnormality or Severe Pain  Until Discontinued,   Status:  Canceled         25  Initiate Group Beta Strep (GBS) Prophylaxis Protocol, If Criteria Met  Continuous,   Status:  Canceled        Comments: NO TREATMENT RECOMMENDED IF: 1) Maternal GBS Status Known Negative 2) Scheduled  Birth With Intact Membranes, Not in Labor 3) Maternal GBS Status Unknown, No Risk Factors  TREAT WITH ANTIBIOTICS IF:  1) Maternal GBS Status Known Positive 2) Maternal GBS Status Unknown With Risk Factors: a)  Previous Infant Affected By GBS Infection b) GBS Urinary Tract Infection (UTI) or Bacteriuria During Pregnancy c) Unexplained Maternal Fever (100.4F (38C) or Greater) During Labor d)  Prolonged Rupture of Membranes (18 or More Hours) e) Gestational Age Less Than 37 Weeks    25  Inpatient Anesthesiology Consult  Once,   Status:  Canceled        Specialty:  Anesthesiology  Provider:  (Not yet assigned)    25  Insert Peripheral IV  Once,   Status:  Canceled         25  Saline Lock & Maintain IV Access  Continuous,   Status:  Canceled         25  sodium chloride 0.9 % infusion 40 mL  As Needed,    "Status:  Discontinued         01/29/25 0434 01/29/25 0430  lidocaine (XYLOCAINE) 1 % injection 0.5 mL  Once As Needed,   Status:  Discontinued         01/29/25 0434 01/29/25 0430  lactated ringers bolus 1,000 mL  Once As Needed         01/29/25 0434 01/29/25 0430  acetaminophen (TYLENOL) tablet 650 mg  Every 4 Hours PRN,   Status:  Discontinued         01/29/25 0434 01/29/25 0430  butorphanol (STADOL) injection 2 mg  Every 3 Hours PRN,   Status:  Discontinued         01/29/25 0434 01/29/25 0430  ondansetron ODT (ZOFRAN-ODT) disintegrating tablet 4 mg  Every 6 Hours PRN,   Status:  Discontinued        Placed in \"Or\" Linked Group    01/29/25 0434 01/29/25 0430  ondansetron (ZOFRAN) injection 4 mg  Every 6 Hours PRN,   Status:  Discontinued        Placed in \"Or\" Linked Group    01/29/25 0434 01/29/25 0430  terbutaline (BRETHINE) injection 0.25 mg  As Needed,   Status:  Discontinued         01/29/25 0434 01/29/25 0430  mineral oil liquid 30 mL  Once As Needed,   Status:  Discontinued         01/29/25 0434 01/29/25 0430  famotidine (PEPCID) injection 20 mg  2 Times Daily PRN,   Status:  Discontinued        Placed in \"Or\" Linked Group    01/29/25 0434 01/29/25 0430  famotidine (PEPCID) tablet 20 mg  2 Times Daily PRN,   Status:  Discontinued        Placed in \"Or\" Linked Group    01/29/25 0434 01/29/25 0430  Position Change - For Intra-Uterine Resusitation for Hypertonus, HyperStimulation or Non-Reassuring Fetal Status  As Needed,   Status:  Canceled       01/29/25 0434 01/29/25 0430  sodium chloride 0.9 % flush 10 mL  As Needed,   Status:  Discontinued         01/29/25 0434    Unscheduled  Up with Assistance  As Needed       01/30/25 2115    Unscheduled  Bladder Scan if Patient Unable to Void 4-6 Hours After Catheter Removal  As Needed         01/30/25 2115    Unscheduled  Straight Cath Every 4-6 Hours As Needed If Patient is Unable to Void After 4-6 Hours, Bladder Scan Volume is " Greater Than 500mL & Patient Has Symptoms of Bladder Discomfort / Distention  As Needed       25    Unscheduled  Schedule / Prompt Voiding For Patients With Urinary Incontinence  As Needed       25    Unscheduled  Wound Care  As Needed      Comments: Postop day 1. Remove dressing and leave incision open to air.    25    Unscheduled  Chewing Gum  As Needed       25    Unscheduled  Apply witch hazel pads / TUCKS to perineum as needed for comfort PRN  As Needed       25    Unscheduled  Warm compress  As Needed       25    Unscheduled  Apply ace wrap, tight bra, or binder  As Needed       25    Unscheduled  Apply ice packs  As Needed       25                     Operative/Procedure Notes (last 72 hours)        Lauri Barney MD at 25 1816          [facility]   Obstetrics and Gynecology     2025    Patient:Giovanna Hickey   MR#:[id number]     Section Procedure Note    Indications: failed induction    Pre-operative Diagnosis:   Intrauterine pregnancy at 39w1d  Labor status: Induced Onset of Labor    Pregnancy    Large for dates    Polyhydramnios in third trimester    Post-operative Diagnosis: same    Procedure:  Low transverse  section     Surgeon: Lauri Barney MD     Assistants:  EDER DAUGHERTY    Anesthesia: Epidural anesthesia    Prenatal care problem list:  Patient Active Problem List   Diagnosis    Carrier of fragile X syndrome    UTI (urinary tract infection) in pregnancy in first trimester    Elevated glucose tolerance test    Maternal anemia in pregnancy, antepartum    Large for dates    Polyhydramnios in third trimester    Pregnancy       Procedure Details   The patient was seen in the LDR preoperatively. The risks, benefits, complications, treatment options, and expected outcomes were discussed with the patient.  The patient concurred with the proposed plan, giving informed  consent.  The site of surgery is discussed. The patient was taken to Operating Room # 1, identified as Giovanna Hickey and the procedure verified as  Delivery. A Time Out was held and the above information confirmed.    After induction of anesthesia, the patient was draped and prepped in the usual sterile manner. A Pfannenstiel incision was made and carried down through the subcutaneous tissue to the fascia. Fascial incision was made and extended transversely. The fascia was  from the underlying rectus tissue superiorly and inferiorly. The peritoneum was identified and entered. Peritoneal incision was extended longitudinally. The utero-vesical peritoneal reflection was incised transversely and the bladder flap was bluntly freed from the lower uterine segment. A low transverse uterine incision was made. Delivered from vertex presentation was a male fetus 3780 g (8 lb 5.3 oz) with Apgar scores of 8 at one minute and 9 at five minutes. After the umbilical cord was clamped and cut cord blood was obtained for evaluation. The placenta was removed intact and appeared normal. The uterine outline, tubes and ovaries appeared normal.  The uterine incision was closed with running locked sutures of 0 monocryl. Hemostasis was observed. Lavage was carried out until clear.     Peritoneum was closed with 2-0 Vicryl in a running fashion incorporating the muscle in the closure    The fascia was then reapproximated with running sutures of 0 Vicryl. The deep subcutaneous layer was reapproximated with 3-0 Vicryl in a running fashion. The skin was reapproximated with 3-0 monocryl in a subcuticular fashion.     Instrument, sponge, and needle counts were correct prior the abdominal closure and at the conclusion of the case.     EDER DAUGHERTY was responsible for performing the following activities: Retraction, Suction, Irrigation, Suturing, Closing, and Delivery of Fetus and their skilled assistance was necessary for the  "success of this case.      Findings:  Live viable male fetus    Estimated Blood Loss:   600 cc    Calculated Blood Loss:              Specimens:  Placenta            Complications:  None; patient tolerated the procedure well.           Disposition: PACU - hemodynamically stable.           Condition: stable    Attending Attestation: I was present and scrubbed for the entire procedure.    Cord gases:  No results found for: \"PHCVEN\", \"BECVEN\"    Lauri Barney MD  2025   18:51 EST    Electronically signed by Lauri Barney MD at 25       Lauri Barney MD at 25          See op note  Lauri Barney MD      Electronically signed by Lauri Barney MD at 25          Physician Progress Notes (last 72 hours)        Gregorio Barcenas MD at 25 1118          [facility]   PROGRESS NOTE    Post-Op Day 1 S/P     Subjective   CC: post  section    Patient reports:  Patient reports that her pain is well-controlled with oral medicines.  She has taken some narcotics.  She desires circumcision for her son.  She has not voided yet.  She is passing gas and tolerating a regular diet.  She is ambulating without assistance.      Review of systems- no shortness of breath, nausea or vomiting or leg pain.    Objective      Vitals: Vital Signs Range for the last 24 hours  Temperature: Temp:  [97.9 °F (36.6 °C)-98.8 °F (37.1 °C)] 98 °F (36.7 °C)       BP: BP: ()/(27-89) 99/59   Pulse: Heart Rate:  [] 76   Respirations: Resp:  [15-19] 16                            Physical exam   General-  no acute distress, conversant    Lungs- respirations unlabored   CV- trace LE edema   Abdomen- soft, nontender, nondistended.  Fundus is firm.   Incision -dressing is dry   Lower extremities- nontender bilaterally    Results from last 7 days   Lab Units 25  1037   WBC 10*3/mm3 9.48   HEMOGLOBIN g/dL 9.1*   HEMATOCRIT % 28.5*   PLATELETS 10*3/mm3 147         Assessment & Plan "        Pregnancy    Large for dates    Polyhydramnios in third trimester    S/P  section      Assessment:    Giovanna Hickey is Day 1  post-op from      Patient is doing well  She desires circumcision for her son-we discussed circumcision is an elective procedure with a small risk of bleeding, infection or injury to the penis.  Patient wished to proceed     Plan:  continue post op care, pain medication prn and encouraged ambulation.        Gregorio Barcenas MD  2025  11:18 EST       Electronically signed by Gregorio Barcenas MD at 25 1143       Lauri Barney MD at 25 1656          [facility]   Obstetrics and Gynecology     2025      Patient:  Giovanna Hickey   MR#:[id number]    Chart note    AROM with clear fluid.      Head is high and cervix is very anterior.  IUPC was very challenging to place.  With epidural in place and thorough exam, pelvic feels abnormal.     The subpubic arch is markedly narrowed and the azeb sidewalls seem to converge anteriorly characteristic of a Android type pelvic.  VE 80/ft/-2    With EFW at 94% and AC>99th on last scan, I feel there is a risk of shoulder dystocia if head will descend.     Additionally with the long course of induction, option for  offered and the patient wants to proceed.    Discussed the procedure and risk/benefits/alteratives for  trial.       Lauri Barney MD   2025 16:57 EST      Electronically signed by Lauri Barney MD at 25 1708       Alexa Nuñez MD at 25 1815          [facility]  Obstetric Progress Note    Subjective     Patient:    The patient feels mild cramping with ctx. She denies bleeding/leaking fluid. She is feeling fetal movement.     Objective     Vital Signs Range for the last 24 hours  Temp:  [97.9 °F (36.6 °C)-98.7 °F (37.1 °C)] 98.1 °F (36.7 °C)   Temp src: Oral   BP: (113-124)/(74-76) 120/74   Heart Rate:  [] 87   Resp:  [18-20] 18   SpO2:  [100 %] 100 %  "      Device (Oxygen Therapy): room air   Weight:  [79.1 kg (174 lb 6.1 oz)-79.1 kg (174 lb 6.4 oz)] 79.1 kg (174 lb 6.1 oz)       Flowsheet Rows      Flowsheet Row First Filed Value   Admission Height 154.9 cm (61\") Documented at 01/29/2025 0536   Admission Weight 79.1 kg (174 lb 6.4 oz) Documented at 01/29/2025 0433            Intake/Output last 24 hours:    No intake or output data in the 24 hours ending 01/29/25 1815    Intake/Output this shift:    No intake/output data recorded.    Physical Exam:  General: Patient is comfortable and in no acute distress                     Presentation: vtx   Cervix: Exam by: Method: sterile vaginal exam performed   Dilation: Cervical Dilation (cm): 0-1   Effacement: 60   Station:  -2    Cervidil removed         Fetal Heart Rate Assessment   Method: Fetal HR Assessment Method: external   Beats/min: Fetal HR (beats/min): 125   Baseline: Fetal HR Baseline: normal range   Variability: Fetal HR Variability: moderate (amplitude range 6 to 25 bpm)   Accels: Fetal HR Accelerations: greater than/equal to 15 bpm   Decels: Fetal HR Decelerations: absent   Tracing Category:       Uterine Assessment   Method: Method: external tocotransducer, palpation   Frequency (min): Contraction Frequency (Minutes): 2-5   Ctx Count in 10 min: Contractions in 10 Minutes: 3   Duration:     Intensity: Contraction Intensity: mild by palpation   Intensity by IUPC:     Resting Tone: Uterine Resting Tone: soft by palpation   Resting Tone by IUPC:     [place] Units:         Assessment & Plan       Pregnancy    Large for dates    Polyhydramnios in third trimester        Assessment:  1.  Intrauterine pregnancy at 39w0d gestation with reactive fetal status.    2.  induction of labor  for polyhydramnios and large for dates  with unfavorable cervix: pt has had minimal progress s/p cervidil. Reviewed options for further management to include cervical balloon, pitocin, cytotec or cervidil. Risks and benefits " discussed. Pt desires to rest briefly and proceed with another course of cervidil.   3.  Obstetrical history significant for is non-contributory.  4.  GBS status:   Strep Gp B Culture   Date Value Ref Range Status   2025 Negative Negative Final     Comment:     Centers for Disease Control and Prevention (CDC) and American Congress  of Obstetricians and Gynecologists (ACOG) guidelines for prevention of   group B streptococcal (GBS) disease specify co-collection of  a vaginal and rectal swab specimen to maximize sensitivity of GBS  detection. Per the CDC and ACOG, swabbing both the lower vagina and  rectum substantially increases the yield of detection compared with  sampling the vagina alone.  Penicillin G, ampicillin, or cefazolin are indicated for intrapartum  prophylaxis of  GBS colonization. Reflex susceptibility  testing should be performed prior to use of clindamycin only on GBS  isolates from penicillin-allergic women who are considered a high risk  for anaphylaxis. Treatment with vancomycin without additional testing  is warranted if resistance to clindamycin is noted.         Plan:  Repeat course of cervidil following dinner/rest  2. Plan of care has been reviewed with patient and spouse  3.  Risks, benefits of treatment plan have been discussed.  4.  All questions have been answered.      Alexa Nuñez MD  2025  18:15 EST        Electronically signed by Alexa Nuñez MD at [id number]       Consult Notes (last 72 hours)  Notes from 25 1422 through 25 1422   No notes of this type exist for this encounter.

## 2025-01-31 NOTE — PLAN OF CARE
Goal Outcome Evaluation:      VSS, assessment WNL, castanon catheter in place, ambulated x 1 w/ 2 person assist, breast feeding, pain controlled w/ medication

## 2025-02-01 RX ADMIN — IBUPROFEN 600 MG: 600 TABLET, FILM COATED ORAL at 11:21

## 2025-02-01 RX ADMIN — IBUPROFEN 600 MG: 600 TABLET, FILM COATED ORAL at 04:58

## 2025-02-01 RX ADMIN — MUPIROCIN: 20 OINTMENT TOPICAL at 10:53

## 2025-02-01 RX ADMIN — ACETAMINOPHEN 650 MG: 325 TABLET, FILM COATED ORAL at 20:33

## 2025-02-01 RX ADMIN — ACETAMINOPHEN 650 MG: 325 TABLET, FILM COATED ORAL at 08:51

## 2025-02-01 RX ADMIN — IBUPROFEN 600 MG: 600 TABLET, FILM COATED ORAL at 17:30

## 2025-02-01 RX ADMIN — ACETAMINOPHEN 650 MG: 325 TABLET, FILM COATED ORAL at 14:24

## 2025-02-01 RX ADMIN — OXYCODONE HYDROCHLORIDE 5 MG: 5 TABLET ORAL at 18:36

## 2025-02-01 RX ADMIN — ACETAMINOPHEN 650 MG: 325 TABLET, FILM COATED ORAL at 00:48

## 2025-02-01 RX ADMIN — IBUPROFEN 600 MG: 600 TABLET, FILM COATED ORAL at 23:21

## 2025-02-01 NOTE — PROGRESS NOTES
[facility]   Obstetrics and Gynecology     2025    Name:Giovanna Hickey    MR#:[id number]     Progress Note:  Post-Op    HD:3    Subjective   24 y.o. yo Female  s/p CS at 39w1d doing well. Pain well controlled. Tolerating regular diet and having flatus. Lochia normal.     Patient Active Problem List   Diagnosis    Carrier of fragile X syndrome    UTI (urinary tract infection) in pregnancy in first trimester    Elevated glucose tolerance test    Maternal anemia in pregnancy, antepartum    Large for dates    Polyhydramnios in third trimester    Pregnancy    S/P  section        Objective    Vitals  Temp:  Temp:  [97.9 °F (36.6 °C)-98 °F (36.7 °C)] 97.9 °F (36.6 °C)  Temp src: Oral  BP:  BP: ()/(65-81) 119/81  Pulse:  Heart Rate:  [76-84] 76  RR:   Resp:  [16] 16  Weight: 79.1 kg (174 lb 6.1 oz)  BMI:  Body mass index is 32.95 kg/m².    Physical Exam  Vitals and nursing note reviewed.   Constitutional:       Appearance: Normal appearance.   Pulmonary:      Effort: Pulmonary effort is normal.   Neurological:      Mental Status: She is alert and oriented to person, place, and time.   Psychiatric:         Mood and Affect: Mood normal.         Behavior: Behavior normal.         I/O last 3 completed shifts:  In: 2464.8 [P.O.:1350; I.V.:1114.8]  Out: 2884 [Urine:2400; Blood:484]    LABS:  Results from last 7 days   Lab Units 25  1037 25  0524   WBC 10*3/mm3 9.48 11.73*   HEMOGLOBIN g/dL 9.1* 12.8   HEMATOCRIT % 28.5* 38.9   PLATELETS 10*3/mm3 147 162     Results from last 7 days   Lab Units 25  0524   SODIUM mmol/L 137   POTASSIUM mmol/L 4.1   CHLORIDE mmol/L 103   CO2 mmol/L 19.5*   BUN mg/dL 7   CREATININE mg/dL 0.68   CALCIUM mg/dL 8.9   BILIRUBIN mg/dL <0.2   ALK PHOS U/L 157*   ALT (SGPT) U/L 10   AST (SGOT) U/L 15   GLUCOSE mg/dL 90       Infant: male      Assessment    1.  POD#2     Pregnancy    Large for dates    Polyhydramnios in third  trimester    S/P  section      Plan:  Continue routine postoperative care     Antoinette Guerrero MD  2025 13:27 EST

## 2025-02-01 NOTE — LACTATION NOTE
This note was copied from a baby's chart.  Mom reports baby latches well but is sleepy at times.  Last feeding was at 1100 and fed for 10 min one side and 15 on the other.  Has had adequate output.  Weight loss is at 5.6 %.  Encouraged to call for latch assistance if needed or for any questions or concerns.  Would like assist learning to use personal pump and will call when ready. LC number on whiteboard.

## 2025-02-01 NOTE — PLAN OF CARE
Problem: Adult Inpatient Plan of Care  Goal: Plan of Care Review  Outcome: Progressing  Flowsheets (Taken 2/1/2025 1548)  Outcome Evaluation: VSS, fundus and lochia wnl, pain well controlled with ERAS medications, pt ambulating independently, voiding spontaneously, bonding well with baby  Goal: Patient-Specific Goal (Individualized)  Outcome: Progressing  Goal: Absence of Hospital-Acquired Illness or Injury  Outcome: Progressing  Intervention: Identify and Manage Fall Risk  Recent Flowsheet Documentation  Taken 2/1/2025 1524 by Tiffanie Hanna RN  Safety Promotion/Fall Prevention: safety round/check completed  Taken 2/1/2025 1424 by Tiffanie Hanna RN  Safety Promotion/Fall Prevention: safety round/check completed  Taken 2/1/2025 1221 by Tiffanie Hanna RN  Safety Promotion/Fall Prevention: safety round/check completed  Taken 2/1/2025 1121 by Tiffanie Hanna RN  Safety Promotion/Fall Prevention: safety round/check completed  Taken 2/1/2025 1015 by Tiffanie Hanna RN  Safety Promotion/Fall Prevention: safety round/check completed  Taken 2/1/2025 0951 by Tiffanie Hanna RN  Safety Promotion/Fall Prevention: safety round/check completed  Taken 2/1/2025 0851 by Tiffanie Hanna RN  Safety Promotion/Fall Prevention: safety round/check completed  Goal: Optimal Comfort and Wellbeing  Outcome: Progressing  Intervention: Monitor Pain and Promote Comfort  Recent Flowsheet Documentation  Taken 2/1/2025 1424 by Tiffanie Hanna RN  Pain Management Interventions: pain medication given  Taken 2/1/2025 1121 by Tiffanie Hanna RN  Pain Management Interventions: pain medication given  Taken 2/1/2025 0851 by Tiffanie Hanna RN  Pain Management Interventions: pain medication given  Intervention: Provide Person-Centered Care  Recent Flowsheet Documentation  Taken 2/1/2025 0851 by Tiffanie Hanna RN  Trust Relationship/Rapport:   care explained   questions answered   questions encouraged   thoughts/feelings acknowledged  Goal: Readiness for  Transition of Care  Outcome: Progressing     Problem: Pain Acute  Goal: Optimal Pain Control and Function  Outcome: Progressing  Intervention: Develop Pain Management Plan  Recent Flowsheet Documentation  Taken 2025 1424 by Tiffanie Hanna RN  Pain Management Interventions: pain medication given  Taken 2025 1121 by Tiffanie Hanna RN  Pain Management Interventions: pain medication given  Taken 2025 0851 by Tiffanie Hanna RN  Pain Management Interventions: pain medication given  Intervention: Prevent or Manage Pain  Recent Flowsheet Documentation  Taken 2025 1424 by Tiffanie Hanna RN  Medication Review/Management: medications reviewed  Taken 2025 1221 by Tiffanie Hanna RN  Medication Review/Management: medications reviewed  Taken 2025 1121 by Tiffanie Hanna RN  Medication Review/Management: medications reviewed  Taken 2025 0951 by Tiffanie Hanna RN  Medication Review/Management: medications reviewed  Taken 2025 0851 by Tiffanie Hanna RN  Medication Review/Management: medications reviewed     Problem: Skin Injury Risk Increased  Goal: Skin Health and Integrity  Outcome: Progressing  Intervention: Optimize Skin Protection  Recent Flowsheet Documentation  Taken 2025 1524 by Tiffanie Hanna RN  Activity Management: up ad kathrine  Taken 2025 0851 by Tiffanie Hanna RN  Activity Management: up ad kathrine     Problem: Postpartum ( Delivery)  Goal: Successful Parent Role Transition  Outcome: Progressing  Goal: Hemostasis  Outcome: Progressing  Goal: Effective Bowel Elimination  Outcome: Progressing  Goal: Fluid and Electrolyte Balance  Outcome: Progressing  Goal: Absence of Infection Signs and Symptoms  Outcome: Progressing  Goal: Anesthesia/Sedation Recovery  Outcome: Progressing  Intervention: Optimize Anesthesia Recovery  Recent Flowsheet Documentation  Taken 2025 1524 by Tiffanie Hanna RN  Safety Promotion/Fall Prevention: safety round/check completed  Taken 2025 1424 by  Jacques, Tiffanie, RN  Safety Promotion/Fall Prevention: safety round/check completed  Taken 2/1/2025 1221 by Tiffanie Hanna RN  Safety Promotion/Fall Prevention: safety round/check completed  Taken 2/1/2025 1121 by Tiffanie Hanna RN  Safety Promotion/Fall Prevention: safety round/check completed  Taken 2/1/2025 1015 by Tiffanie Hanna RN  Safety Promotion/Fall Prevention: safety round/check completed  Taken 2/1/2025 0951 by Tiffanie Hanna RN  Safety Promotion/Fall Prevention: safety round/check completed  Taken 2/1/2025 0851 by Tiffanie Hanna RN  Safety Promotion/Fall Prevention: safety round/check completed  Goal: Optimal Pain Control and Function  Outcome: Progressing  Intervention: Prevent or Manage Pain  Recent Flowsheet Documentation  Taken 2/1/2025 1424 by Tiffanie Hanna RN  Pain Management Interventions: pain medication given  Taken 2/1/2025 1121 by Tiffanie Hanna RN  Pain Management Interventions: pain medication given  Taken 2/1/2025 0851 by Tiffanie Hanna RN  Perineal Care: absorbent brief/pad changed  Pain Management Interventions: pain medication given  Goal: Nausea and Vomiting Relief  Outcome: Progressing  Goal: Effective Urinary Elimination  Outcome: Progressing  Goal: Effective Oxygenation and Ventilation  Outcome: Progressing   Goal Outcome Evaluation:              Outcome Evaluation: VSS, fundus and lochia wnl, pain well controlled with ERAS medications, pt ambulating independently, voiding spontaneously, bonding well with baby

## 2025-02-02 VITALS
WEIGHT: 174.38 LBS | HEART RATE: 71 BPM | RESPIRATION RATE: 16 BRPM | TEMPERATURE: 98.6 F | HEIGHT: 61 IN | DIASTOLIC BLOOD PRESSURE: 70 MMHG | OXYGEN SATURATION: 98 % | BODY MASS INDEX: 32.92 KG/M2 | SYSTOLIC BLOOD PRESSURE: 107 MMHG

## 2025-02-02 PROBLEM — O61.0 FAILED MEDICAL INDUCTION OF LABOR: Status: ACTIVE | Noted: 2025-02-02

## 2025-02-02 PROBLEM — Q74.2: Status: ACTIVE | Noted: 2025-02-02

## 2025-02-02 PROCEDURE — 0503F POSTPARTUM CARE VISIT: CPT | Performed by: OBSTETRICS & GYNECOLOGY

## 2025-02-02 RX ORDER — HYDROCODONE BITARTRATE AND ACETAMINOPHEN 5; 325 MG/1; MG/1
1 TABLET ORAL EVERY 6 HOURS PRN
Qty: 12 TABLET | Refills: 0 | Status: SHIPPED | OUTPATIENT
Start: 2025-02-02

## 2025-02-02 RX ORDER — IBUPROFEN 800 MG/1
800 TABLET, FILM COATED ORAL EVERY 8 HOURS PRN
Qty: 30 TABLET | Refills: 1 | Status: SHIPPED | OUTPATIENT
Start: 2025-02-02

## 2025-02-02 RX ADMIN — ACETAMINOPHEN 650 MG: 325 TABLET, FILM COATED ORAL at 08:35

## 2025-02-02 RX ADMIN — IBUPROFEN 600 MG: 600 TABLET, FILM COATED ORAL at 06:03

## 2025-02-02 RX ADMIN — ACETAMINOPHEN 650 MG: 325 TABLET, FILM COATED ORAL at 02:47

## 2025-02-02 RX ADMIN — OXYCODONE HYDROCHLORIDE 5 MG: 5 TABLET ORAL at 04:52

## 2025-02-02 NOTE — PLAN OF CARE
Problem: Adult Inpatient Plan of Care  Goal: Plan of Care Review  Outcome: Met  Goal: Patient-Specific Goal (Individualized)  Outcome: Met  Goal: Absence of Hospital-Acquired Illness or Injury  Outcome: Met  Intervention: Identify and Manage Fall Risk  Recent Flowsheet Documentation  Taken 2025 1010 by Tiffanie Hanna RN  Safety Promotion/Fall Prevention: safety round/check completed  Taken 2025 by Tiffanie Hanna RN  Safety Promotion/Fall Prevention: safety round/check completed  Goal: Optimal Comfort and Wellbeing  Outcome: Met  Intervention: Monitor Pain and Promote Comfort  Recent Flowsheet Documentation  Taken 2025 by Tiffanie Hanna RN  Pain Management Interventions: pain medication given  Intervention: Provide Person-Centered Care  Recent Flowsheet Documentation  Taken 2025 by Tiffanie Hanna RN  Trust Relationship/Rapport:   care explained   questions answered   questions encouraged   thoughts/feelings acknowledged  Goal: Readiness for Transition of Care  Outcome: Met     Problem: Pain Acute  Goal: Optimal Pain Control and Function  Outcome: Met  Intervention: Develop Pain Management Plan  Recent Flowsheet Documentation  Taken 2025 by Tiffanie Hanna RN  Pain Management Interventions: pain medication given  Intervention: Prevent or Manage Pain  Recent Flowsheet Documentation  Taken 2025 1010 by Tiffanie Hanna RN  Medication Review/Management: medications reviewed  Taken 2025 by Tiffanie Hanna RN  Medication Review/Management: medications reviewed     Problem: Skin Injury Risk Increased  Goal: Skin Health and Integrity  Outcome: Met  Intervention: Optimize Skin Protection  Recent Flowsheet Documentation  Taken 2025 by Tiffanie Hanna RN  Activity Management: up ad kathrine     Problem: Postpartum ( Delivery)  Goal: Successful Parent Role Transition  Outcome: Met  Goal: Hemostasis  Outcome: Met  Goal: Effective Bowel Elimination  Outcome: Met  Goal:  Fluid and Electrolyte Balance  Outcome: Met  Goal: Absence of Infection Signs and Symptoms  Outcome: Met  Goal: Anesthesia/Sedation Recovery  Outcome: Met  Intervention: Optimize Anesthesia Recovery  Recent Flowsheet Documentation  Taken 2/2/2025 1010 by Tiffanie Hanna RN  Safety Promotion/Fall Prevention: safety round/check completed  Taken 2/2/2025 0835 by Tiffanie Hanna RN  Safety Promotion/Fall Prevention: safety round/check completed  Goal: Optimal Pain Control and Function  Outcome: Met  Intervention: Prevent or Manage Pain  Recent Flowsheet Documentation  Taken 2/2/2025 0835 by Tiffanie Hanna RN  Perineal Care: absorbent brief/pad changed  Pain Management Interventions: pain medication given  Goal: Nausea and Vomiting Relief  Outcome: Met  Goal: Effective Urinary Elimination  Outcome: Met  Goal: Effective Oxygenation and Ventilation  Outcome: Met   Goal Outcome Evaluation:              Outcome Evaluation: VSS, fundus and lochia wnl, pain well controlled with ERAS medications, pt ambulating independently, voiding spontaneously, bonding well with baby

## 2025-02-02 NOTE — LACTATION NOTE
"This note was copied from a baby's chart.  Called for assist with Motif personal pump.  Has not been cleaned.  Educated on cleaning prior to first use and supplies given.  Measured for flange size and is a 21 mm.  Will assist with pumping in am after parts have been cleaned.  Baby is currently latched to L breast in football hold and is making \"clicking\" sound.  Showed Mom how to break latch and educated on how to obtain a deep latch.  Baby re latched and deep jaw excursion observed and was more comfortable.  Nipples are red and tender.  Using APNO given per RN. Educated on use and importance of a deep latch.    "

## 2025-02-02 NOTE — LACTATION NOTE
"This note was copied from a baby's chart.  Plans for discharge home soon.  Reports nipples are still sore.  Redness observed but no damage.  Has APNO and has been using here.  Reports baby still is \"clicking\" with feedings and has a tight frenulum.   Recommended possible ENT or pediatric dentist consult if nipple discomfort and clicking continues.  Recommended to pump if baby does not latch well or if nipple pain worsens and to give EBM first, then formula.  Went over Motif pump use and pumped with a 21 mm flange and was comfortable.  Mom expressed colostrum easily.  Educated on milk storage, to expect milk supply day 3-5, symptoms and treatment for engorgement, and info given for [facility].  Encouraged to call for any questions or concerns.   "

## 2025-02-02 NOTE — PROGRESS NOTES
[facility]   Obstetrics and Gynecology     2025    Name:Giovanna Hickey    MR#:[id number]     Progress Note:  Post-Op    HD:4    Subjective   24 y.o. yo Female  s/p CS at 39w1d doing well. Pain well controlled. Tolerating regular diet and having flatus. Lochia normal.     Patient Active Problem List   Diagnosis    Carrier of fragile X syndrome    UTI (urinary tract infection) in pregnancy in first trimester    Elevated glucose tolerance test    Maternal anemia in pregnancy, antepartum    Large for dates    Polyhydramnios in third trimester    Pregnancy    S/P  section        Objective    Vitals  Temp:  Temp:  [98.6 °F (37 °C)-98.8 °F (37.1 °C)] 98.6 °F (37 °C)  Temp src: Oral  BP:  BP: (107-116)/(70-73) 107/70  Pulse:  Heart Rate:  [71-79] 71  RR:   Resp:  [16-17] 16  Weight: 79.1 kg (174 lb 6.1 oz)  BMI:  Body mass index is 32.95 kg/m².    Physical Exam  Vitals and nursing note reviewed.   Constitutional:       General: She is not in acute distress.     Appearance: Normal appearance. She is well-developed. She is not ill-appearing.   HENT:      Head: Normocephalic.   Pulmonary:      Effort: Pulmonary effort is normal.   Abdominal:      General: Abdomen is flat. There is no distension.      Palpations: Abdomen is soft. There is no mass.      Tenderness: There is no abdominal tenderness.   Genitourinary:     Vagina: Normal.      Comments: Lochia is scant  Fundus is firm    Incision:  dry/clean/intact  Musculoskeletal:         General: No swelling or tenderness.   Neurological:      Mental Status: She is alert and oriented to person, place, and time.   Psychiatric:         Behavior: Behavior normal.         Thought Content: Thought content normal.         Judgment: Judgment normal.         I/O last 3 completed shifts:  In: 1800 [P.O.:1800]  Out: 900 [Urine:900]    LABS:  Results from last 7 days   Lab Units 25  1037 25  0524   WBC 10*3/mm3 9.48 11.73*    HEMOGLOBIN g/dL 9.1* 12.8   HEMATOCRIT % 28.5* 38.9   PLATELETS 10*3/mm3 147 162     Results from last 7 days   Lab Units 25  0524   SODIUM mmol/L 137   POTASSIUM mmol/L 4.1   CHLORIDE mmol/L 103   CO2 mmol/L 19.5*   BUN mg/dL 7   CREATININE mg/dL 0.68   CALCIUM mg/dL 8.9   BILIRUBIN mg/dL <0.2   ALK PHOS U/L 157*   ALT (SGPT) U/L 10   AST (SGOT) U/L 15   GLUCOSE mg/dL 90       Infant: male      Assessment    1.  POD#3     Pregnancy    Large for dates    Polyhydramnios in third trimester    S/P  section      Plan:  Discharge today     Lauri Barney MD  2025 08:50 EST

## 2025-02-03 ENCOUNTER — MATERNAL SCREENING (OUTPATIENT)
Dept: CALL CENTER | Facility: HOSPITAL | Age: 25
End: 2025-02-03
Payer: COMMERCIAL

## 2025-02-03 NOTE — DISCHARGE SUMMARY
[facility]   Obstetrics and Gynecology    Section Discharge Summary    Date of Admission: 2025  Date of Discharge:  2025      Patient: Giovanna Hickey      MR#:[id number]    Surgeon/OB: Lauri Barney MD    Primary OB:  Alexa Nuñez MD       Discharge Diagnosis:    section at 39w1d, uncomplicated recovery    Pregnancy    Large for dates    Polyhydramnios in third trimester    S/P  section    Failed medical induction of labor    Android pelvis        Procedures:  , Low Transverse    2025   6:20 PM     Anesthesia:  Epidural    Hospital Course  Patient is a 24 y.o. female  at 39w1d status post  section with uneventful postoperative recovery.  Patient was advanced to regular diet on postoperative day#1.  On discharge, ambulating, tolerating a regular diet without any difficulties and her incision is dry, clean and intact.     Infant:   male fetus 3780 g (8 lb 5.3 oz) with Apgar scores of 8 , 9  at five minutes.    Condition on Discharge:  Stable    Vital Signs  Temp:  [98.6 °F (37 °C)] 98.6 °F (37 °C)  Heart Rate:  [71-79] 71  Resp:  [16] 16  BP: (107-116)/(70-73) 107/70    Results from last 7 days   Lab Units 25  1037 25  0524   WBC 10*3/mm3 9.48 11.73*   HEMOGLOBIN g/dL 9.1* 12.8   HEMATOCRIT % 28.5* 38.9   PLATELETS 10*3/mm3 147 162     Results from last 7 days   Lab Units 25  0524   SODIUM mmol/L 137   POTASSIUM mmol/L 4.1   CHLORIDE mmol/L 103   CO2 mmol/L 19.5*   BUN mg/dL 7   CREATININE mg/dL 0.68   CALCIUM mg/dL 8.9   BILIRUBIN mg/dL <0.2   ALK PHOS U/L 157*   ALT (SGPT) U/L 10   AST (SGOT) U/L 15   GLUCOSE mg/dL 90         Discharge Disposition  Home or Self Care    Discharge Medications     Your medication list        START taking these medications        Instructions Last Dose Given Next Dose Due   HYDROcodone-acetaminophen 5-325 MG per tablet  Commonly known as: Norco      Take 1 tablet by mouth Every 6  (Six) Hours As Needed for Moderate Pain.       ibuprofen 800 MG tablet  Commonly known as: ADVIL,MOTRIN      Take 1 tablet by mouth Every 8 (Eight) Hours As Needed for Moderate Pain for up to 40 doses.              CONTINUE taking these medications        Instructions Last Dose Given Next Dose Due   ferrous sulfate 325 (65 FE) MG tablet      Take 1 tablet by mouth Daily With Breakfast.       FreeStyle Lite w/Device kit      USE 1 EACH AS NEEDED (USE TO TEST GLUCOSE 4 TIMES DAILY).       glucose monitor monitoring kit      Use 1 each As Needed (use to test glucose 4 times daily).       Lancets 30G misc      Use 1 Device 4 (Four) Times a Day.       prenatal vitamin 27-0.8 27-0.8 MG tablet tablet      Take  by mouth Daily.              STOP taking these medications      doxylamine-pyridoxine 10-10 MG tablet delayed-release EC tablet  Commonly known as: DICLEGIS        glucose blood test strip                  Where to Get Your Medications        These medications were sent to [facility] [place] [address]      Hours: Monday to Friday 7 AM to 6 PM, Saturday & Sunday 8 AM to 4:30 PM (Closed 12 PM to 12:30 PM) Phone: [phone]   HYDROcodone-acetaminophen 5-325 MG per tablet  ibuprofen 800 MG tablet           Discharge Diet: Regular    Follow-up Appointments  Future Appointments   Date Time Provider Department Center   11/21/2025  8:30 AM Kaitlynn St APRN [facility]     Additional Instructions for the Follow-ups that You Need to Schedule       Call MD for problems / concerns.   As directed      Call for any problems with  1.  Heavy vaginal bleeding (greater than 2 pads an hours for 2 hours)  2.  Fever above 101.3  3.  Incisional redness  4.  Signs of Preeclampsia:  headache, visual changes or elevated blood pressure    Order Comments: Call for any problems with 1.  Heavy vaginal bleeding (greater than 2 pads an hours for 2 hours) 2.  Fever above 101.3 3.   Incisional redness 4.  Signs of Preeclampsia:  headache, visual changes or elevated blood pressure                 Prenatal labs/vax:   Immunization History   Administered Date(s) Administered    ABRYSVO (RSV, 60+ or pregnant women 32-36 wks) 12/20/2024    Fluzone  >6mos 10/11/2024    Tdap 11/08/2024       External Prenatal Results       Pregnancy Outside Results - Transcribed From Office Records - See Scanned Records For Details       Test Value Date Time    ABO  AB  01/29/25 0524    Rh  Positive  01/29/25 0524    Antibody Screen  Negative  01/29/25 0524       Negative  08/16/24 1335    Varicella IgG  1,409 index 07/12/24 1427    Rubella  6.93 index 07/12/24 1427    Hgb  9.1 g/dL 01/31/25 1037       12.8 g/dL 01/29/25 0524       11.6 g/dL 12/27/24 1632       12.2 g/dL 11/08/24 1244       12.4 g/dL 07/12/24 1427    Hct  28.5 % 01/31/25 1037       38.9 % 01/29/25 0524       34.4 % 12/27/24 1632       36.7 % 11/08/24 1244       36.5 % 07/12/24 1427    HgB A1c   5.4 % 07/12/24 1427    1h GTT  155 mg/dL 11/08/24 1244    3h GTT Fasting  87 mg/dL 11/15/24 0811    3h GTT 1 hour  169 mg/dL 11/15/24 0811    3h GTT 2 hour  129 mg/dL 11/15/24 0811    3h GTT 3 hour   95 mg/dL 11/15/24 0811    Gonorrhea (discrete)  Negative  08/16/24 1327    Chlamydia (discrete)  Negative  08/16/24 1327    RPR  Non Reactive  07/12/24 1427    Syphils cascade: TP-Ab (FTA)  Non-Reactive  01/29/25 0524       Non Reactive  11/08/24 1244    TP-Ab  Non-Reactive  01/29/25 0524       Non Reactive  11/08/24 1244    TP-Ab (EIA)       TPPA       HBsAg  Negative  07/12/24 1427    Herpes Simplex Virus PCR       Herpes Simplex VIrus Culture       HIV  Non Reactive  07/12/24 1427    Hep C RNA Quant PCR       Hep C Antibody  Non Reactive  07/12/24 1427    AFP  40.0 ng/mL 08/16/24 1335    NIPT ^ low risk  07/12/24     Cystic Fibrosis ([place])  Negative  07/12/24 1349    Cystic Fibroisis  ^ negative  07/12/24     Spinal Muscular atrophy  Negative  07/12/24 1275     Fragile X       Group B Strep  Negative  01/03/25 1635    GBS Susceptibility to Clindamycin       GBS Susceptibility to Erythromycin       Fetal Fibronectin       Genetic Testing, Maternal Blood                 Drug Screening       Test Value Date Time    Urine Drug Screen       Amphetamine Screen       Barbiturate Screen       Benzodiazepine Screen       Methadone Screen       Phencyclidine Screen       Opiates Screen       THC Screen       Cocaine Screen       Propoxyphene Screen       Buprenorphine Screen       Methamphetamine Screen       Oxycodone Screen       Tricyclic Antidepressants Screen                 Legend    ^: Historical                              Lauri Barney MD  2/2/2025  19:56 EST

## 2025-02-03 NOTE — ANESTHESIA POSTPROCEDURE EVALUATION
"Patient: Giovanna Hickey    Procedure Summary       Date: 25 Room / Location:  [facility]    Anesthesia Start:  Anesthesia Stop:     Procedure:  SECTION PRIMARY (Abdomen) Diagnosis: (Failed induction)    Surgeons: Lauri Barney MD Provider: Rock Evans MD    Anesthesia Type: epidural ASA Status: 2            Anesthesia Type: epidural    Vitals  Vitals Value Taken Time   /70 25 0712   Temp 37 °C (98.6 °F) 25 0712   Pulse 71 25 0712   Resp 16 25 0712   SpO2 98 % 25 0610           Post Anesthesia Care and Evaluation    Level of consciousness: awake and alert  Pain management: adequate    Airway patency: patent  Anesthetic complications: No anesthetic complications  PONV Status: none  Cardiovascular status: acceptable  Respiratory status: acceptable  Hydration status: acceptable    Comments: /70 (BP Location: Right arm, Patient Position: Sitting)   Pulse 71   Temp 37 °C (98.6 °F) (Oral)   Resp 16   Ht 154.9 cm (61\")   Wt 79.1 kg (174 lb 6.1 oz)   LMP 2024 (Exact Date)   SpO2 98%   Breastfeeding Yes   BMI 32.95 kg/m²           "

## 2025-02-03 NOTE — OUTREACH NOTE
Maternal Screening Survey      Flowsheet Row Responses   Eligibility Eligible   Prep survey completed? Yes   Facility patient discharged from? [facility]

## 2025-02-04 ENCOUNTER — TELEPHONE (OUTPATIENT)
Dept: OBSTETRICS AND GYNECOLOGY | Age: 25
End: 2025-02-04
Payer: COMMERCIAL

## 2025-02-04 NOTE — TELEPHONE ENCOUNTER
Patient just given birth and had a . Patient is breastfeeding and wanting to know if it's okay to take stool softener?

## 2025-02-10 ENCOUNTER — POSTPARTUM VISIT (OUTPATIENT)
Dept: OBSTETRICS AND GYNECOLOGY | Age: 25
End: 2025-02-10
Payer: COMMERCIAL

## 2025-02-10 VITALS — BODY MASS INDEX: 29.25 KG/M2 | DIASTOLIC BLOOD PRESSURE: 74 MMHG | SYSTOLIC BLOOD PRESSURE: 100 MMHG | WEIGHT: 154.8 LBS

## 2025-02-10 DIAGNOSIS — Z98.891 S/P CESAREAN SECTION: Primary | ICD-10-CM

## 2025-02-10 PROBLEM — O40.3XX0 POLYHYDRAMNIOS IN THIRD TRIMESTER: Status: RESOLVED | Noted: 2025-01-16 | Resolved: 2025-02-10

## 2025-02-10 PROBLEM — Z34.90 PREGNANCY: Status: RESOLVED | Noted: 2025-01-29 | Resolved: 2025-02-10

## 2025-02-10 PROBLEM — O99.019 MATERNAL ANEMIA IN PREGNANCY, ANTEPARTUM: Status: RESOLVED | Noted: 2024-12-30 | Resolved: 2025-02-10

## 2025-02-10 NOTE — PROGRESS NOTES
"Chief Complaint   Patient presents with    Postpartum Care     1 Week, 3 Day's PP. Patient has no concerns.      Baby Boy \"Yong\"  8 lbs 5.3 Ounces  Breastfeeding         HPI  Giovanna Hickey is a 24 y.o. female presents for pp visit and denies any issues. She reports lochia is light. Her son is feeding well. She denies mood issues.        The following portions of the patient's history were reviewed and updated as appropriate: allergies, current medications, past family history, past medical history, past social history, past surgical history, and problem list.    Review of Systems  Pertinent items are noted in HPI.    /74 (BP Location: Left arm, Patient Position: Sitting, Cuff Size: Adult)   Wt 70.2 kg (154 lb 12.8 oz)   LMP 2024 (Exact Date)   Breastfeeding Yes   BMI 29.25 kg/m²         Physical Exam  Abdominal:      Comments: Abdomen is soft  Incision is clean/dry/intact; no surrounding erythema or swelling   Musculoskeletal:      Comments: Bilateral lower ext are without cords, tenderness or edema   Neurological:      General: No focal deficit present.      Mental Status: She is alert and oriented to person, place, and time.   Psychiatric:         Mood and Affect: Mood normal.         Behavior: Behavior normal.             Diagnoses and all orders for this visit:    1. S/P  section (Primary)      Normal postpartum exam. Reviewed ongoing activity restrictions. Plan f/u 4 wks or prn. Pt plans condoms for contraception.          "

## 2025-02-11 ENCOUNTER — MATERNAL SCREENING (OUTPATIENT)
Dept: CALL CENTER | Facility: HOSPITAL | Age: 25
End: 2025-02-11
Payer: COMMERCIAL

## 2025-02-11 NOTE — OUTREACH NOTE
Maternal Screening Survey      Flowsheet Row Responses   Facility patient discharged from? [place]   Attempt successful? No   Unsuccessful attempts Attempt 1              Lakshmi VALERA - Registered Nurse

## 2025-02-11 NOTE — OUTREACH NOTE
Maternal Screening Survey      Flowsheet Row Responses   Facility patient discharged from? [facility]   Attempt successful? No   Unsuccessful attempts Attempt 2              Lakshmi VALERA - Registered Nurse

## 2025-02-12 ENCOUNTER — MATERNAL SCREENING (OUTPATIENT)
Dept: CALL CENTER | Facility: HOSPITAL | Age: 25
End: 2025-02-12
Payer: COMMERCIAL

## 2025-02-12 NOTE — OUTREACH NOTE
Maternal Screening Survey      Flowsheet Row Responses   [facility]   [facility]? Yes   Call start time 1157   Call end time 1159   I have been able to laugh and see the funny side of things. 0   I have looked forward with enjoyment to things. 0   I have blamed myself unnecessarily when things went wrong. 0   I have been anxious or worried for no good reason. 0   I have felt scared or panicky for no good reason. 0   Things have been getting on top of me. 0   I have been so unhappy that I have had difficulty sleeping. 0   I have felt sad or miserable. 0   I have been so unhappy that I have been crying. 0   The thought of harming myself has occurred to me. 0   [place]  Depression Scale Total 0   Did any of your parents have problems with alcohol or drug use? No   Do any of your peers have problems with alcohol or drug use? No   Does your partner have problems with alcohol or drug use? No   Before you were pregnant did you have problems with alcohol or drug use? (past) No   In the past month, did you drink beer, wine, liquor or use any other drugs? (pregnancy) No   Maternal Screening call completed Yes   Wrap up additional comments pt stated she feels the same. Does know if anything changes to call her OB   Call end time 1159              MARY NOGUERA - Registered Nurse

## 2025-03-10 ENCOUNTER — POSTPARTUM VISIT (OUTPATIENT)
Dept: OBSTETRICS AND GYNECOLOGY | Age: 25
End: 2025-03-10
Payer: COMMERCIAL

## 2025-03-10 VITALS — BODY MASS INDEX: 28.98 KG/M2 | WEIGHT: 153.4 LBS | SYSTOLIC BLOOD PRESSURE: 116 MMHG | DIASTOLIC BLOOD PRESSURE: 80 MMHG

## 2025-03-10 DIAGNOSIS — Z12.4 PAP SMEAR FOR CERVICAL CANCER SCREENING: ICD-10-CM

## 2025-03-10 PROBLEM — O61.0 FAILED MEDICAL INDUCTION OF LABOR: Status: RESOLVED | Noted: 2025-02-02 | Resolved: 2025-03-10

## 2025-03-10 PROBLEM — O23.41 UTI (URINARY TRACT INFECTION) IN PREGNANCY IN FIRST TRIMESTER: Status: RESOLVED | Noted: 2024-08-16 | Resolved: 2025-03-10

## 2025-03-10 PROBLEM — R73.09 ELEVATED GLUCOSE TOLERANCE TEST: Status: RESOLVED | Noted: 2024-11-13 | Resolved: 2025-03-10

## 2025-03-10 NOTE — PROGRESS NOTES
Subjective   Giovanna Hickey is a 24 y.o. female who presents for a postpartum visit. She is 6 weeks postpartum following a low cervical transverse  section. I have fully reviewed the prenatal and intrapartum course. The delivery was at 39 gestational weeks. Outcome: primary  section, low transverse incision.  Postpartum course has been normal. Baby's course has been normal. Baby is feeding by breast/bottle. Bleeding no bleeding. Bowel function is normal. Bladder function is normal. Patient is not sexually active. Contraception method is  pt now desires Paragard . Postpartum depression screening: negative.    The following portions of the patient's history were reviewed and updated as appropriate: allergies, current medications, past family history, past medical history, past social history, past surgical history, and problem list.    Review of Systems  Pertinent items are noted in HPI.    Objective   /80 (BP Location: Right arm, Patient Position: Sitting, Cuff Size: Adult)   Wt 69.6 kg (153 lb 6.4 oz)   Breastfeeding Yes   BMI 28.98 kg/m²    General:  alert, appears stated age, cooperative, and no distress    Breasts:  def   Lungs: clear to auscultation bilaterally   Heart:  regular rate and rhythm   Abdomen: Soft, nontender, incision healed    Vulva:  normal   Vagina: normal vagina   Cervix:  no lesions   Corpus: normal size, contour, position, consistency, mobility, non-tender   Adnexa:  No masses or tenderness   Rectal Exam: Not performed.     Assessment & Plan   Normal postpartum exam. Pap smear done at today's visit.    1. Contraception: IUD-pt requests Paragard. Reviewed risks to include infection, perforation, surgery to remove, expulsion, increased menstrual flow, pelvic pain and failure to prevent pregnancy. Handout provided  2. Advised pt to call if she does not receive results of pap within 2 wks  3. Follow up in:  2 to 3   weeks or as needed.

## 2025-03-14 LAB
CONV .: NORMAL
CYTOLOGIST CVX/VAG CYTO: NORMAL
CYTOLOGY CVX/VAG DOC CYTO: NORMAL
CYTOLOGY CVX/VAG DOC THIN PREP: NORMAL
DX ICD CODE: NORMAL
OTHER STN SPEC: NORMAL
SERVICE CMNT-IMP: NORMAL
STAT OF ADQ CVX/VAG CYTO-IMP: NORMAL

## 2025-03-28 ENCOUNTER — OFFICE VISIT (OUTPATIENT)
Dept: OBSTETRICS AND GYNECOLOGY | Age: 25
End: 2025-03-28
Payer: COMMERCIAL

## 2025-03-28 VITALS — BODY MASS INDEX: 29.25 KG/M2 | DIASTOLIC BLOOD PRESSURE: 70 MMHG | WEIGHT: 154.8 LBS | SYSTOLIC BLOOD PRESSURE: 98 MMHG

## 2025-03-28 DIAGNOSIS — Z01.812 PRE-PROCEDURE LAB EXAM: ICD-10-CM

## 2025-03-28 DIAGNOSIS — Z30.430 ENCOUNTER FOR IUD INSERTION: Primary | ICD-10-CM

## 2025-03-28 LAB
B-HCG UR QL: NEGATIVE
EXPIRATION DATE: NORMAL
INTERNAL NEGATIVE CONTROL: NORMAL
INTERNAL POSITIVE CONTROL: NORMAL
Lab: NORMAL

## 2025-03-28 RX ORDER — COPPER 313.4 MG/1
1 INTRAUTERINE DEVICE INTRAUTERINE ONCE
Qty: 1 EACH | Refills: 0 | Status: SHIPPED | OUTPATIENT
Start: 2025-03-28 | End: 2025-03-28 | Stop reason: SDUPTHER

## 2025-03-28 RX ORDER — COPPER 313.4 MG/1
1 INTRAUTERINE DEVICE INTRAUTERINE ONCE
Qty: 1 EACH | Refills: 0 | OUTPATIENT
Start: 2025-03-28 | End: 2025-03-28

## 2025-03-28 NOTE — PROGRESS NOTES
Procedures  IUD Insertion Procedure Note    Indication: Desires long acting reversible contraception     Procedure Details   Urine pregnancy test was done and was NEGATIVE .  The risks (including infection, bleeding, pain, and uterine perforation) and benefits of the procedure were explained to the patient and  verbal and written  informed consent was obtained. A procedural time out was performed.       Cervix cleansed with Betadine. Uterus sounded to  7.5  cm. IUD inserted without difficulty using sterile technique. String visible and trimmed at 3 cm.    IUD Information:  ParaGard, Lot # [id number], Expiration date JUL 2027.    Condition:  Stable    Complications:  None    Patient tolerated the procedure well without complications.    Plan:  The patient was advised to call for any fever or for prolonged or severe pain or bleeding.   Reviewed post IUD insertion instructions.   Plan f/u 6 wks or prn.   U/s done following and IUD in appropriate position      Alexa Nuñez MD  3/28/2025  08:24 EDT

## 2025-05-09 ENCOUNTER — OFFICE VISIT (OUTPATIENT)
Dept: OBSTETRICS AND GYNECOLOGY | Age: 25
End: 2025-05-09
Payer: COMMERCIAL

## 2025-05-09 VITALS
SYSTOLIC BLOOD PRESSURE: 100 MMHG | WEIGHT: 147 LBS | HEIGHT: 61 IN | BODY MASS INDEX: 27.75 KG/M2 | DIASTOLIC BLOOD PRESSURE: 70 MMHG

## 2025-05-09 DIAGNOSIS — Z30.431 IUD CHECK UP: Primary | ICD-10-CM

## 2025-05-09 NOTE — PROGRESS NOTES
"Chief Complaint   Patient presents with    Gynecologic Exam     IUD check  CC: no problems        HPI  Giovanna Hickey is a 24 y.o. female presents for IUD check. She denies any issues. She is breastfeeding and not having menses yet. Dave is doing well but started teething.         The following portions of the patient's history were reviewed and updated as appropriate: allergies, current medications, past family history, past medical history, past social history, past surgical history, and problem list.    Review of Systems  Pertinent items are noted in HPI.    /70   Ht 154.9 cm (61\")   Wt 66.7 kg (147 lb)   Breastfeeding Yes   BMI 27.78 kg/m²         Physical Exam  Constitutional:       Appearance: Normal appearance.   Pulmonary:      Effort: Pulmonary effort is normal.   Genitourinary:     Comments: Normal vagina and cervix. IUD strings noted approx 2.5 cm in length.  Neurological:      General: No focal deficit present.      Mental Status: She is alert and oriented to person, place, and time.   Psychiatric:         Mood and Affect: Mood normal.         Behavior: Behavior normal.             Diagnoses and all orders for this visit:    1. IUD check up (Primary)    Normal IUD check, f/u for annual or prn            "

## 2025-07-22 ENCOUNTER — PATIENT ROUNDING (BHMG ONLY) (OUTPATIENT)
Age: 25
End: 2025-07-22
Payer: COMMERCIAL

## 2025-07-22 NOTE — ED NOTES
Thank you for letting us care for you in your recent visit to our [facility] urgent care center. We would love to hear about your experience with us. Was this the first time you have visited our location?         We’re always looking for ways to make our patients’ experiences even better. Do you have any recommendations on ways we may improve?         I appreciate you taking the time to respond. Please be on the lookout for a survey about your recent visit from [employer] via text or email. We would greatly appreciate if you could fill that out and turn it back in. We want your voice to be heard and we value your feedback.    Thank you for choosing [facility] for your healthcare needs.         If you have concerns or would like to speak to me in person regarding your visit please feel free to give me a call. [phone]         Hope you get well soon and thank you.         Emi Kovacs  Practice Manager

## (undated) DEVICE — ANTIBACTERIAL UNDYED BRAIDED (POLYGLACTIN 910), SYNTHETIC ABSORBABLE SUTURE: Brand: COATED VICRYL

## (undated) DEVICE — 3M™ TEGADERM™ TRANSPARENT FILM DRESSING FRAME STYLE, 1627, 4 IN X 10 IN (10 CM X 25 CM), 20/CT 4CT/CASE: Brand: 3M™ TEGADERM™

## (undated) DEVICE — SUT VIC 3/0 CTI 36IN J944H

## (undated) DEVICE — SUT MNCRYL PLS ANTIB UD 4/0 SH 27IN

## (undated) DEVICE — SOL IRR H2O BO 1000ML STRL

## (undated) DEVICE — GLV SURG BIOGEL LTX PF 7 1/2

## (undated) DEVICE — Device: Brand: PORTEX

## (undated) DEVICE — NDL BLNT 18G 1 1/2IN

## (undated) DEVICE — SUT MNCRYL 0/0 CTX 36IN Y398H